# Patient Record
Sex: FEMALE | Race: AMERICAN INDIAN OR ALASKA NATIVE | NOT HISPANIC OR LATINO | Employment: UNEMPLOYED | URBAN - METROPOLITAN AREA
[De-identification: names, ages, dates, MRNs, and addresses within clinical notes are randomized per-mention and may not be internally consistent; named-entity substitution may affect disease eponyms.]

---

## 2017-03-29 ENCOUNTER — OFFICE VISIT (OUTPATIENT)
Dept: MEDICAL GROUP | Facility: CLINIC | Age: 62
End: 2017-03-29
Payer: OTHER GOVERNMENT

## 2017-03-29 VITALS
HEART RATE: 76 BPM | RESPIRATION RATE: 16 BRPM | OXYGEN SATURATION: 95 % | DIASTOLIC BLOOD PRESSURE: 72 MMHG | HEIGHT: 59 IN | SYSTOLIC BLOOD PRESSURE: 128 MMHG | TEMPERATURE: 97.9 F | WEIGHT: 181 LBS | BODY MASS INDEX: 36.49 KG/M2

## 2017-03-29 DIAGNOSIS — M25.512 CHRONIC LEFT SHOULDER PAIN: ICD-10-CM

## 2017-03-29 DIAGNOSIS — E89.0 POSTABLATIVE HYPOTHYROIDISM: ICD-10-CM

## 2017-03-29 DIAGNOSIS — G89.29 CHRONIC LEFT SHOULDER PAIN: ICD-10-CM

## 2017-03-29 DIAGNOSIS — Z85.42 HISTORY OF ENDOMETRIAL CANCER: ICD-10-CM

## 2017-03-29 PROCEDURE — 99214 OFFICE O/P EST MOD 30 MIN: CPT | Performed by: NURSE PRACTITIONER

## 2017-03-29 RX ORDER — NAPROXEN 500 MG/1
500 TABLET ORAL 2 TIMES DAILY WITH MEALS
Qty: 14 TAB | Refills: 0 | Status: SHIPPED | OUTPATIENT
Start: 2017-03-29 | End: 2017-04-05

## 2017-03-29 RX ORDER — CYCLOBENZAPRINE HCL 10 MG
5-10 TABLET ORAL 3 TIMES DAILY PRN
Qty: 30 TAB | Refills: 0 | Status: SHIPPED | OUTPATIENT
Start: 2017-03-29 | End: 2017-12-11

## 2017-03-29 NOTE — MR AVS SNAPSHOT
"        Niya Lamin   3/29/2017 12:00 PM   Office Visit   MRN: [id number]    Department:  [facility]   Dept Phone:  [phone]    Description:  Female : 1955   Provider:  TAYLOR Kemp           Reason for Visit     Fall Fell in November; takes care of  and hasn't had time       Allergies as of 3/29/2017     Allergen Noted Reactions    Byetta 2012   Swelling      You were diagnosed with     Chronic left shoulder pain   [155113]       History of endometrial cancer   [647953]         Vital Signs     Blood Pressure Pulse Temperature Respirations Height Weight    128/72 mmHg 76 36.6 °C (97.9 °F) 16 1.499 m (4' 11.02\") 82.101 kg (181 lb)    Body Mass Index Oxygen Saturation Breastfeeding? Smoking Status          36.54 kg/m2 95% No Former Smoker        Basic Information     Date Of Birth Sex Race Ethnicity Preferred Language    1955 Female White Non- English      Problem List              ICD-10-CM Priority Class Noted - Resolved    Obesity (BMI 35.0-39.9 without comorbidity) (HCC) E66.9 Low  2016 - Present    Postablative hypothyroidism E89.0 Medium  2016 - Present    Osteopenia M85.80 Low  2016 - Present    Chronic joint pain-ankle, knee, back M25.50, G89.29 Low  2016 - Present    Pure hypercholesterolemia E78.00 Medium  2016 - Present    Essential hypertension I10 Medium  2016 - Present    Episode of recurrent major depressive disorder (CMS-HCC) F33.9   2016 - Present    History of endometrial cancer Z85.42   2016 - Present      Health Maintenance        Date Due Completion Dates    IMM DTaP/Tdap/Td Vaccine (1 - Tdap) 9/10/1974 ---    COLONOSCOPY 9/10/2005 ---    MAMMOGRAM 12/10/2016 12/10/2015, 2014, 2012    PAP SMEAR 2019    BONE DENSITY 2020, 2014, 2012            Current Immunizations     Influenza Vaccine Quad Inj (Pf) 2016    SHINGLES VACCINE 2016 "      Below and/or attached are the medications your provider expects you to take. Review all of your home medications and newly ordered medications with your provider and/or pharmacist. Follow medication instructions as directed by your provider and/or pharmacist. Please keep your medication list with you and share with your provider. Update the information when medications are discontinued, doses are changed, or new medications (including over-the-counter products) are added; and carry medication information at all times in the event of emergency situations     Allergies:  BYETTA - Swelling               Medications  Valid as of: March 29, 2017 - 12:50 PM    Generic Name Brand Name Tablet Size Instructions for use    Aspirin-Caffeine   Take  by mouth as needed.        Cyclobenzaprine HCl (Tab) FLEXERIL 10 MG Take 0.5-1 Tabs by mouth 3 times a day as needed for Moderate Pain or Muscle Spasms.        Ezetimibe-Simvastatin (Tab) VYTORIN 10-40 MG Take 1 Tab by mouth every day.        Ibandronate Sodium (Tab) BONIVA 150 MG Take 1 Tab by mouth every 30 days.        Levothyroxine Sodium (Tab) SYNTHROID 150 MCG Take 1 Tab by mouth Every morning on an empty stomach.        Losartan Potassium (Tab) COZAAR 100 MG Take 1 Tab by mouth every day.        Multiple Vitamins-Minerals   Take  by mouth every day.        Naproxen (Tab) NAPROSYN 500 MG Take 1 Tab by mouth 2 times a day, with meals for 7 days.        Sertraline HCl (Tab) ZOLOFT 50 MG Take 1 Tab by mouth every day. Start with half tab for week        Tretinoin Microsphere (Gel) RETIN-A MICRO 0.04 % Apply to affected areas QHS        .                 Medicines prescribed today were sent to:     [facility] - [address] [facility]    [address] [place] [address]    Phone: [phone] Fax: [phone]    Open 24 Hours?: No    [facility] [place] - [address] AT [facility] [address]    [address]  [place] [address]    Phone: [phone] Fax: [phone]    Open 24 Hours?: No      Medication refill instructions:       If your prescription bottle indicates you have medication refills left, it is not necessary to call your provider’s office. Please contact your pharmacy and they will refill your medication.    If your prescription bottle indicates you do not have any refills left, you may request refills at any time through one of the following ways: The online [employer]po[employer] system (except Urgent Care), by calling your provider’s office, or by asking your pharmacy to contact your provider’s office with a refill request. Medication refills are processed only during regular business hours and may not be available until the next business day. Your provider may request additional information or to have a follow-up visit with you prior to refilling your medication.   *Please Note: Medication refills are assigned a new Rx number when refilled electronically. Your pharmacy may indicate that no refills were authorized even though a new prescription for the same medication is available at the pharmacy. Please request the medicine by name with the pharmacy before contacting your provider for a refill.        Your To Do List     Future Labs/Procedures Complete By Expires    CBC WITH DIFFERENTIAL  As directed 3/30/2018    DX-SHOULDER 2+ LEFT  As directed 3/29/2018      Referral     A referral request has been sent to our patient care coordination department. Please allow 3-5 business days for us to process this request and contact you either by phone or mail. If you do not hear from us by the 5th business day, please call us at [phone].           [employer]po[employer] Access Code: Activation code not generated  Current Tempo[employer] Status: Active

## 2017-03-30 NOTE — PROGRESS NOTES
CC: Fall        HPI:     Niya presents today for the followin. Chronic left shoulder pain  Here today stating that in November she had a fall at home in her garage with subsequent left shoulder pain. She states with didn't hurt her too much in the beginning however over the subsequent months is beginning to hurt her more and more.  It has caused her to have decreased range of motion and difficulty sometimes lifting her arm. Other times she can lift it up fine however it's painful to move it back towards her side.    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 This may have caused increased stress in the left arm.    Has been taking aspirin products for pain.    2. History of endometrial cancer  Is required to do an upcoming CBC for Dr. Short. Like the order to our office this does not have multiple blood draws  - CBC WITH DIFFERENTIAL; Future    3. Postablative hypothyroidism  Is currently on her levothyroxine 150 µg. Unfortunately due to communication error she just started this new dose less than a week ago.    Current Outpatient Prescriptions   Medication Sig Dispense Refill   • naproxen (NAPROSYN) 500 MG Tab Take 1 Tab by mouth 2 times a day, with meals for 7 days. 14 Tab 0   • cyclobenzaprine (FLEXERIL) 10 MG Tab Take 0.5-1 Tabs by mouth 3 times a day as needed for Moderate Pain or Muscle Spasms. 30 Tab 0   • levothyroxine (SYNTHROID) 150 MCG Tab Take 1 Tab by mouth Every morning on an empty stomach. 90 Tab 3   • ezetimibe-simvastatin (VYTORIN) 10-40 MG per tablet Take 1 Tab by mouth every day. 90 Tab 3   • ibandronate (BONIVA) 150 MG tablet Take 1 Tab by mouth every 30 days. 4 Tab 3   • losartan (COZAAR) 100 MG Tab Take 1 Tab by mouth every day. 90 Tab 3   • tretinoin microspheres (RETIN-A MICRO) 0.04 % gel Apply to affected areas QHS 45 g 3   • sertraline (ZOLOFT) 50 MG Tab Take 1 Tab by mouth every day. Start with half tab for  "week 90 Tab 3   • Multiple Vitamins-Minerals (WOMENS MULTIVITAMIN PLUS PO) Take  by mouth every day.     • Aspirin-Caffeine (ANALGESIC PO) Take  by mouth as needed.       No current facility-administered medications for this visit.     Social History   Substance Use Topics   • Smoking status: Former Smoker -- 0.10 packs/day for 2 years     Types: Cigarettes   • Smokeless tobacco: Never Used   • Alcohol Use: 0.6 oz/week     1 Shots of liquor per week     I reviewed patients allergies, problem list and medications today in EPIC.    ROS: Any/all pertinent positives listed in the HPI, otherwise all others reviewed are negative today.      /72 mmHg  Pulse 76  Temp(Src) 36.6 °C (97.9 °F)  Resp 16  Ht 1.499 m (4' 11.02\")  Wt 82.101 kg (181 lb)  BMI 36.54 kg/m2  SpO2 95%  Breastfeeding? No    Exam:    Gen: Alert and oriented, No apparent distress. WDWN  Psych: A+Ox3, normal affect and mood  Skin: Warm, dry and intact. Good turgor   No rashes in visible areas.  Eye: Conjunctiva clear, lids normal  ENMT: Lips without lesions, good dentition  Lungs: Clear to auscultation bilaterally, no rales or rhonchi   Unlabored respiratory effort.   No gross deformity erythema or edema of the left shoulder.  Visibly significant decreased range of motion both with lateral, anterior elevation of the arm-reports pain at about 30° off the body. Initially decreased range of motion with internal rotation to touch the back.  Tenderness, mild, on the anterior aspect of the shoulder only  Associated tenderness with discomfort on upper trapezius and around the scapula.    Assessment and Plan.   61 y.o. female with the following issues.    1. Chronic left shoulder pain  Stable. Suspicious for rotator cuff injury. Chronic at this point. Trial of naproxen with Flexeril-caution for sedation with the latter of the 2 medications. X-ray ordered to rule out fracture with fall and she is unsure how she fell.  Discussed if she has improvement " with these medications while physical therapy. She has no improvement will proceed with MRI.  - naproxen (NAPROSYN) 500 MG Tab; Take 1 Tab by mouth 2 times a day, with meals for 7 days.  Dispense: 14 Tab; Refill: 0  - cyclobenzaprine (FLEXERIL) 10 MG Tab; Take 0.5-1 Tabs by mouth 3 times a day as needed for Moderate Pain or Muscle Spasms.  Dispense: 30 Tab; Refill: 0  - DX-SHOULDER 2+ LEFT; Future  - REFERRAL TO PHYSICAL THERAPY Reason for Therapy: Eval/Treat/Report    2. History of endometrial cancer  Stable. CBC ordered per routine following by oncology  - CBC WITH DIFFERENTIAL; Future    3. Postablative hypothyroidism  Stable. Continue medication. Has pending order to complete in 6 weeks. Patient verbalized understanding

## 2017-04-03 ENCOUNTER — APPOINTMENT (OUTPATIENT)
Dept: RADIOLOGY | Facility: IMAGING CENTER | Age: 62
End: 2017-04-03
Attending: NURSE PRACTITIONER
Payer: OTHER GOVERNMENT

## 2017-04-03 ENCOUNTER — NON-PROVIDER VISIT (OUTPATIENT)
Dept: URGENT CARE | Facility: PHYSICIAN GROUP | Age: 62
End: 2017-04-03
Payer: OTHER GOVERNMENT

## 2017-04-03 DIAGNOSIS — M25.512 CHRONIC LEFT SHOULDER PAIN: ICD-10-CM

## 2017-04-03 DIAGNOSIS — G89.29 CHRONIC LEFT SHOULDER PAIN: ICD-10-CM

## 2017-04-03 PROCEDURE — 73030 X-RAY EXAM OF SHOULDER: CPT | Mod: 26,LT | Performed by: PHYSICIAN ASSISTANT

## 2017-12-06 ENCOUNTER — HOSPITAL ENCOUNTER (OUTPATIENT)
Dept: LAB | Facility: MEDICAL CENTER | Age: 62
End: 2017-12-06
Attending: NURSE PRACTITIONER
Payer: OTHER GOVERNMENT

## 2017-12-06 DIAGNOSIS — E89.0 POSTABLATIVE HYPOTHYROIDISM: ICD-10-CM

## 2017-12-06 DIAGNOSIS — Z85.42 HISTORY OF ENDOMETRIAL CANCER: ICD-10-CM

## 2017-12-06 LAB
BASOPHILS # BLD AUTO: 0.6 % (ref 0–1.8)
BASOPHILS # BLD: 0.04 K/UL (ref 0–0.12)
EOSINOPHIL # BLD AUTO: 0 K/UL (ref 0–0.51)
EOSINOPHIL NFR BLD: 0 % (ref 0–6.9)
ERYTHROCYTE [DISTWIDTH] IN BLOOD BY AUTOMATED COUNT: 44.1 FL (ref 35.9–50)
HCT VFR BLD AUTO: 38.8 % (ref 37–47)
HGB BLD-MCNC: 12.8 G/DL (ref 12–16)
IMM GRANULOCYTES # BLD AUTO: 0.03 K/UL (ref 0–0.11)
IMM GRANULOCYTES NFR BLD AUTO: 0.4 % (ref 0–0.9)
LYMPHOCYTES # BLD AUTO: 2.36 K/UL (ref 1–4.8)
LYMPHOCYTES NFR BLD: 32.6 % (ref 22–41)
MCH RBC QN AUTO: 30.9 PG (ref 27–33)
MCHC RBC AUTO-ENTMCNC: 33 G/DL (ref 33.6–35)
MCV RBC AUTO: 93.7 FL (ref 81.4–97.8)
MONOCYTES # BLD AUTO: 0.62 K/UL (ref 0–0.85)
MONOCYTES NFR BLD AUTO: 8.6 % (ref 0–13.4)
NEUTROPHILS # BLD AUTO: 4.2 K/UL (ref 2–7.15)
NEUTROPHILS NFR BLD: 57.8 % (ref 44–72)
NRBC # BLD AUTO: 0 K/UL
NRBC BLD AUTO-RTO: 0 /100 WBC
PLATELET # BLD AUTO: 233 K/UL (ref 164–446)
PMV BLD AUTO: 11.3 FL (ref 9–12.9)
RBC # BLD AUTO: 4.14 M/UL (ref 4.2–5.4)
TSH SERPL DL<=0.005 MIU/L-ACNC: 5.13 UIU/ML (ref 0.3–3.7)
WBC # BLD AUTO: 7.3 K/UL (ref 4.8–10.8)

## 2017-12-06 PROCEDURE — 84443 ASSAY THYROID STIM HORMONE: CPT

## 2017-12-06 PROCEDURE — 85025 COMPLETE CBC W/AUTO DIFF WBC: CPT

## 2017-12-06 PROCEDURE — 36415 COLL VENOUS BLD VENIPUNCTURE: CPT

## 2017-12-11 ENCOUNTER — OFFICE VISIT (OUTPATIENT)
Dept: MEDICAL GROUP | Facility: CLINIC | Age: 62
End: 2017-12-11
Payer: OTHER GOVERNMENT

## 2017-12-11 ENCOUNTER — PATIENT MESSAGE (OUTPATIENT)
Dept: MEDICAL GROUP | Facility: CLINIC | Age: 62
End: 2017-12-11

## 2017-12-11 VITALS
HEIGHT: 59 IN | RESPIRATION RATE: 14 BRPM | HEART RATE: 72 BPM | SYSTOLIC BLOOD PRESSURE: 120 MMHG | TEMPERATURE: 97.7 F | OXYGEN SATURATION: 96 % | BODY MASS INDEX: 35.88 KG/M2 | WEIGHT: 178 LBS | DIASTOLIC BLOOD PRESSURE: 68 MMHG

## 2017-12-11 DIAGNOSIS — Z23 NEED FOR VACCINATION: ICD-10-CM

## 2017-12-11 DIAGNOSIS — E89.0 POSTABLATIVE HYPOTHYROIDISM: ICD-10-CM

## 2017-12-11 DIAGNOSIS — R11.0 NAUSEA: ICD-10-CM

## 2017-12-11 DIAGNOSIS — M85.80 OSTEOPENIA, UNSPECIFIED LOCATION: ICD-10-CM

## 2017-12-11 DIAGNOSIS — Z12.31 ENCOUNTER FOR SCREENING MAMMOGRAM FOR BREAST CANCER: ICD-10-CM

## 2017-12-11 DIAGNOSIS — I10 ESSENTIAL HYPERTENSION: ICD-10-CM

## 2017-12-11 DIAGNOSIS — E78.00 PURE HYPERCHOLESTEROLEMIA: ICD-10-CM

## 2017-12-11 DIAGNOSIS — E66.9 OBESITY (BMI 30-39.9): ICD-10-CM

## 2017-12-11 DIAGNOSIS — F33.9 EPISODE OF RECURRENT MAJOR DEPRESSIVE DISORDER, UNSPECIFIED DEPRESSION EPISODE SEVERITY (HCC): ICD-10-CM

## 2017-12-11 PROCEDURE — 99214 OFFICE O/P EST MOD 30 MIN: CPT | Mod: 25 | Performed by: NURSE PRACTITIONER

## 2017-12-11 PROCEDURE — 90686 IIV4 VACC NO PRSV 0.5 ML IM: CPT | Performed by: NURSE PRACTITIONER

## 2017-12-11 PROCEDURE — 90471 IMMUNIZATION ADMIN: CPT | Performed by: NURSE PRACTITIONER

## 2017-12-11 RX ORDER — IBANDRONATE SODIUM 150 MG/1
150 TABLET, FILM COATED ORAL
Qty: 3 TAB | Refills: 4 | Status: SHIPPED | OUTPATIENT
Start: 2017-12-11 | End: 2018-11-26 | Stop reason: SDUPTHER

## 2017-12-11 RX ORDER — LOSARTAN POTASSIUM 100 MG/1
100 TABLET ORAL DAILY
Qty: 90 TAB | Refills: 3 | Status: SHIPPED | OUTPATIENT
Start: 2017-12-11 | End: 2018-11-26 | Stop reason: SDUPTHER

## 2017-12-11 RX ORDER — LEVOTHYROXINE SODIUM 0.03 MG/1
25 TABLET ORAL
Qty: 90 TAB | Refills: 1 | Status: SHIPPED | OUTPATIENT
Start: 2017-12-11 | End: 2018-11-26

## 2017-12-11 RX ORDER — TRETINOIN 0.4 MG/G
GEL TOPICAL
Qty: 45 G | Refills: 3 | Status: SHIPPED | OUTPATIENT
Start: 2017-12-11 | End: 2019-09-20 | Stop reason: SDUPTHER

## 2017-12-11 RX ORDER — EZETIMIBE AND SIMVASTATIN 10; 40 MG/1; MG/1
1 TABLET ORAL DAILY
Qty: 90 TAB | Refills: 3 | Status: SHIPPED | OUTPATIENT
Start: 2017-12-11 | End: 2018-11-26 | Stop reason: SDUPTHER

## 2017-12-11 ASSESSMENT — PATIENT HEALTH QUESTIONNAIRE - PHQ9: CLINICAL INTERPRETATION OF PHQ2 SCORE: 0

## 2017-12-11 NOTE — PROGRESS NOTES
"CC: Office Visit (Medications refilled; labs results) and GI Problem (A few times while having BM, gets nauseated and once vomited )        HPI:     Niya presents today for the followin. Postablative hypothyroidism  Recent labs show increasing TSH despite compliance with daily med.  She however thinks her medication is 125mcg (no the 150mcg that we changed it to last year).      2. Pure hypercholesterolemia  comlaint with meds, no reports of adverse reactions.  No c/o chest pains    3. Osteopenia, unspecified location  C/o with boniva.  No adverse reactions    4. Essential hypertension  Complaint with losartan-doing well    5. Episode of recurrent major depressive disorder, unspecified depression episode severity (CMS-HCC)  Complaint with zoloft, feels this is hel ing.    6. Nausea  States she has had a handful of occurrences were she will be on the toilet having a BM and will feel nauseous.  On one occasion, she threw up.  Only has occurred with BM, intermittent/rare.  Denies significant constipation of straining.  \"little straining at times\"  No assoc abdominal pain, change in appetite or BMs.    Usually (always) approx 3 BMs/day.     Had neg colonoscopy  in [place].-completely normal.    Current Outpatient Prescriptions   Medication Sig Dispense Refill   • ezetimibe-simvastatin (VYTORIN) 10-40 MG per tablet Take 1 Tab by mouth every day. 90 Tab 3   • ibandronate (BONIVA) 150 MG tablet Take 1 Tab by mouth every 30 days. 3 Tab 4   • losartan (COZAAR) 100 MG Tab Take 1 Tab by mouth every day. 90 Tab 3   • tretinoin microspheres (RETIN-A MICRO) 0.04 % gel Apply to affected areas QHS 45 g 3   • sertraline (ZOLOFT) 50 MG Tab Take 1 Tab by mouth every day. Start with half tab for week 90 Tab 3   • levothyroxine (SYNTHROID) 150 MCG Tab Take 1 Tab by mouth Every morning on an empty stomach. 90 Tab 3   • Multiple Vitamins-Minerals (WOMENS MULTIVITAMIN PLUS PO) Take  by mouth every day.     • Aspirin-Caffeine " "(ANALGESIC PO) Take  by mouth as needed.       No current facility-administered medications for this visit.      Social History   Substance Use Topics   • Smoking status: Former Smoker     Packs/day: 0.10     Years: 2.00     Types: Cigarettes   • Smokeless tobacco: Never Used   • Alcohol use 0.6 oz/week     1 Shots of liquor per week     I reviewed patients allergies, problem list and medications today in EPIC.    ROS: Any/all pertinent positives listed in the HPI, otherwise all others reviewed are negative today.      /68   Pulse 72   Temp 36.5 °C (97.7 °F)   Resp 14   Ht 1.499 m (4' 11\")   Wt 80.7 kg (178 lb)   SpO2 96%   Breastfeeding? No   BMI 35.95 kg/m²     Exam:  Gen: Alert and oriented, No apparent distress. WDWN  Psych: A+Ox3, normal affect and mood  Skin: Warm, dry and intact. Good turgor   No rashes in visible areas.  Eye: Conjunctiva clear, lids normal  ENMT: Lips without lesions, good dentition  Lungs: Clear to auscultation bilaterally, no rales or rhonchi   Unlabored respiratory effort.   CV: Regular rate and rhythm, S1, S2. No murmurs.   No Edema  Abd: Soft non tender, non distended. Normal active bowel sounds.    No Hepatosplenomegaly, No pulsatile masses.   Ext: No clubbing, cyanosis, edema.       Assessment and Plan.   62 y.o. female with the following issues.    1. Postablative hypothyroidism  Stable. We do need increased whatever dose she is currently taking. She will notify me later today if she has 125 or 150 µg at home    2. Pure hypercholesterolemia  Stable. Continue current medications  - ezetimibe-simvastatin (VYTORIN) 10-40 MG per tablet; Take 1 Tab by mouth every day.  Dispense: 90 Tab; Refill: 3    3. Osteopenia, unspecified location  Stable continue current medications  - ibandronate (BONIVA) 150 MG tablet; Take 1 Tab by mouth every 30 days.  Dispense: 3 Tab; Refill: 4    4. Essential hypertension  Stable continue current medications  - losartan (COZAAR) 100 MG Tab; Take 1 " Tab by mouth every day.  Dispense: 90 Tab; Refill: 3    5. Episode of recurrent major depressive disorder, unspecified depression episode severity (CMS-HCC)  Stable continue current medications  - sertraline (ZOLOFT) 50 MG Tab; Take 1 Tab by mouth every day. Start with half tab for week  Dispense: 90 Tab; Refill: 3    6. Encounter for screening mammogram for breast cancer  Ordered  - MA-SCREEN MAMMO W/CAD-BILAT; Future    7. Need for vaccination  I have placed the below orders and discussed them with an approved delegating provider. The MA is performing the below orders under the direction of an office MD.  -Given today.    - INFLUENZA VACCINE QUAD INJ >3Y(PF)    8. Obesity (BMI 30-39.9)  Did discuss increasing fiber  - Patient identified as having weight management issue.  Appropriate orders and counseling given.    9.. Nausea  I do think this is something to do with some mild Valsalva or straining. We'll increase her fiber to notify me she's not getting improvement.

## 2017-12-12 NOTE — TELEPHONE ENCOUNTER
From: Niya Kimble  To: TAYLOR Kemp  Sent: 12/11/2017 2:24 PM PST  Subject: Prescription Question    Just letting you know my Levothyroxine' current dose is 0.15mg so I was wrong thinking it was 125

## 2017-12-30 ENCOUNTER — HOSPITAL ENCOUNTER (OUTPATIENT)
Dept: RADIOLOGY | Facility: MEDICAL CENTER | Age: 62
End: 2017-12-30
Attending: NURSE PRACTITIONER
Payer: OTHER GOVERNMENT

## 2017-12-30 DIAGNOSIS — Z12.31 ENCOUNTER FOR SCREENING MAMMOGRAM FOR BREAST CANCER: ICD-10-CM

## 2017-12-30 PROCEDURE — G0202 SCR MAMMO BI INCL CAD: HCPCS

## 2018-06-28 ENCOUNTER — TELEPHONE (OUTPATIENT)
Dept: MEDICAL GROUP | Facility: CLINIC | Age: 63
End: 2018-06-28

## 2018-06-28 DIAGNOSIS — E89.0 POSTABLATIVE HYPOTHYROIDISM: ICD-10-CM

## 2018-06-29 ENCOUNTER — PATIENT MESSAGE (OUTPATIENT)
Dept: MEDICAL GROUP | Facility: CLINIC | Age: 63
End: 2018-06-29

## 2018-06-29 DIAGNOSIS — E89.0 POSTABLATIVE HYPOTHYROIDISM: ICD-10-CM

## 2018-06-29 RX ORDER — LEVOTHYROXINE SODIUM 175 UG/1
175 TABLET ORAL
Qty: 90 TAB | Refills: 0 | Status: SHIPPED | OUTPATIENT
Start: 2018-06-29 | End: 2018-07-06 | Stop reason: SDUPTHER

## 2018-06-29 NOTE — TELEPHONE ENCOUNTER
1. Caller Name: Niya                                         Call Back Number: [phone] (home)       Patient approves a detailed voicemail message: N\A    Pt asked if she needed a new lab order because she lost the paper and thinks it is now . Also needs refill of higher dose thyroid medication.     Called pt back to let her know lab is not  and she can go to any [facility] lab to have it done or I can mail the order. She will go to the lab near her. Pended medication request.

## 2018-07-06 RX ORDER — LEVOTHYROXINE SODIUM 175 UG/1
175 TABLET ORAL
Qty: 90 TAB | Refills: 0 | Status: SHIPPED | OUTPATIENT
Start: 2018-07-06 | End: 2018-11-26

## 2018-11-19 ENCOUNTER — HOSPITAL ENCOUNTER (OUTPATIENT)
Dept: LAB | Facility: MEDICAL CENTER | Age: 63
End: 2018-11-19
Attending: NURSE PRACTITIONER
Payer: OTHER GOVERNMENT

## 2018-11-19 DIAGNOSIS — E89.0 POSTABLATIVE HYPOTHYROIDISM: ICD-10-CM

## 2018-11-19 LAB — TSH SERPL DL<=0.005 MIU/L-ACNC: 0.15 UIU/ML (ref 0.38–5.33)

## 2018-11-19 PROCEDURE — 84443 ASSAY THYROID STIM HORMONE: CPT

## 2018-11-19 PROCEDURE — 36415 COLL VENOUS BLD VENIPUNCTURE: CPT

## 2018-11-20 ENCOUNTER — TELEPHONE (OUTPATIENT)
Dept: MEDICAL GROUP | Facility: MEDICAL CENTER | Age: 63
End: 2018-11-20

## 2018-11-20 NOTE — TELEPHONE ENCOUNTER
Please call Niya.    Re: thyroid lab I would like to her take only hald a tab of levothyroxine on sundays.  Whole tab other days.  Will recheck her levels in a few months

## 2018-11-21 ENCOUNTER — NON-PROVIDER VISIT (OUTPATIENT)
Dept: MEDICAL GROUP | Facility: MEDICAL CENTER | Age: 63
End: 2018-11-21
Payer: OTHER GOVERNMENT

## 2018-11-21 DIAGNOSIS — Z23 NEED FOR VACCINATION: ICD-10-CM

## 2018-11-21 PROCEDURE — 90715 TDAP VACCINE 7 YRS/> IM: CPT | Performed by: NURSE PRACTITIONER

## 2018-11-21 PROCEDURE — 90472 IMMUNIZATION ADMIN EACH ADD: CPT | Performed by: NURSE PRACTITIONER

## 2018-11-21 PROCEDURE — 90686 IIV4 VACC NO PRSV 0.5 ML IM: CPT | Performed by: NURSE PRACTITIONER

## 2018-11-21 PROCEDURE — 90471 IMMUNIZATION ADMIN: CPT | Performed by: NURSE PRACTITIONER

## 2018-11-26 ENCOUNTER — OFFICE VISIT (OUTPATIENT)
Dept: MEDICAL GROUP | Facility: MEDICAL CENTER | Age: 63
End: 2018-11-26
Payer: OTHER GOVERNMENT

## 2018-11-26 VITALS
HEART RATE: 78 BPM | BODY MASS INDEX: 34.88 KG/M2 | OXYGEN SATURATION: 96 % | TEMPERATURE: 98.4 F | HEIGHT: 59 IN | DIASTOLIC BLOOD PRESSURE: 64 MMHG | SYSTOLIC BLOOD PRESSURE: 124 MMHG | WEIGHT: 173 LBS | RESPIRATION RATE: 14 BRPM

## 2018-11-26 DIAGNOSIS — N39.41 URGE INCONTINENCE: ICD-10-CM

## 2018-11-26 DIAGNOSIS — I10 ESSENTIAL HYPERTENSION: ICD-10-CM

## 2018-11-26 DIAGNOSIS — Z78.0 POSTMENOPAUSAL: ICD-10-CM

## 2018-11-26 DIAGNOSIS — R06.83 SNORING: ICD-10-CM

## 2018-11-26 DIAGNOSIS — E66.9 OBESITY (BMI 30-39.9): ICD-10-CM

## 2018-11-26 DIAGNOSIS — E89.0 POSTABLATIVE HYPOTHYROIDISM: ICD-10-CM

## 2018-11-26 DIAGNOSIS — E78.00 PURE HYPERCHOLESTEROLEMIA: ICD-10-CM

## 2018-11-26 DIAGNOSIS — Z12.11 COLON CANCER SCREENING: ICD-10-CM

## 2018-11-26 DIAGNOSIS — F33.9 EPISODE OF RECURRENT MAJOR DEPRESSIVE DISORDER, UNSPECIFIED DEPRESSION EPISODE SEVERITY (HCC): ICD-10-CM

## 2018-11-26 DIAGNOSIS — R79.89 LOW TSH LEVEL: ICD-10-CM

## 2018-11-26 PROCEDURE — 99214 OFFICE O/P EST MOD 30 MIN: CPT | Performed by: NURSE PRACTITIONER

## 2018-11-26 RX ORDER — LOSARTAN POTASSIUM 100 MG/1
100 TABLET ORAL DAILY
Qty: 90 TAB | Refills: 3 | Status: SHIPPED | OUTPATIENT
Start: 2018-11-26 | End: 2019-09-20 | Stop reason: SDUPTHER

## 2018-11-26 RX ORDER — LEVOTHYROXINE SODIUM 0.15 MG/1
150 TABLET ORAL
Qty: 96 TAB | Refills: 0 | Status: SHIPPED | OUTPATIENT
Start: 2018-11-26 | End: 2019-06-09 | Stop reason: SDUPTHER

## 2018-11-26 RX ORDER — SERTRALINE HYDROCHLORIDE 100 MG/1
100 TABLET, FILM COATED ORAL DAILY
Qty: 90 TAB | Refills: 3 | Status: SHIPPED | OUTPATIENT
Start: 2018-11-26 | End: 2019-09-20 | Stop reason: SDUPTHER

## 2018-11-26 RX ORDER — IBANDRONATE SODIUM 150 MG/1
150 TABLET, FILM COATED ORAL
Qty: 3 TAB | Refills: 4 | Status: SHIPPED | OUTPATIENT
Start: 2018-11-26 | End: 2019-09-20

## 2018-11-26 RX ORDER — EZETIMIBE AND SIMVASTATIN 10; 40 MG/1; MG/1
1 TABLET ORAL DAILY
Qty: 90 TAB | Refills: 3 | Status: SHIPPED | OUTPATIENT
Start: 2018-11-26 | End: 2019-09-20

## 2018-11-26 ASSESSMENT — PATIENT HEALTH QUESTIONNAIRE - PHQ9
7. TROUBLE CONCENTRATING ON THINGS, SUCH AS READING THE NEWSPAPER OR WATCHING TELEVISION: NEARLY EVERY DAY
SUM OF ALL RESPONSES TO PHQ9 QUESTIONS 1 AND 2: 0
2. FEELING DOWN, DEPRESSED, IRRITABLE, OR HOPELESS: NOT AT ALL
3. TROUBLE FALLING OR STAYING ASLEEP OR SLEEPING TOO MUCH: NEARLY EVERY DAY
8. MOVING OR SPEAKING SO SLOWLY THAT OTHER PEOPLE COULD HAVE NOTICED. OR THE OPPOSITE, BEING SO FIGETY OR RESTLESS THAT YOU HAVE BEEN MOVING AROUND A LOT MORE THAN USUAL: NOT AT ALL
4. FEELING TIRED OR HAVING LITTLE ENERGY: NEARLY EVERY DAY
5. POOR APPETITE OR OVEREATING: NEARLY EVERY DAY
6. FEELING BAD ABOUT YOURSELF - OR THAT YOU ARE A FAILURE OR HAVE LET YOURSELF OR YOUR FAMILY DOWN: NOT AL ALL
9. THOUGHTS THAT YOU WOULD BE BETTER OFF DEAD, OR OF HURTING YOURSELF: NOT AT ALL

## 2018-11-26 NOTE — PROGRESS NOTES
CC: Medication Refill (medication refills;)        HPI:     Niya presents today for the followin. Low TSH level/Postablative hypothyroidism  Current dose of 175 mcg of levothyroxine daily.  Current thyroid level shows a low TSH.  Previously was high however she was on 150 at that time.    3. Essential hypertension  Compliant with losartan, no reports of adverse effect at home    4. Pure hypercholesterolemia  Complain on Vytorin, no reports of adverse effect at home, due for lipid panel    5. Snoring  Reports chronic fatigue and does snore    6. Urge incontinence  States she does have issues with urge incontinence.  She denies frequency.  She states that when she does get the sensation that she has to urinate she has to go right then or she has a potential for leakage.  Wears pads.  Does also leak if she sneezes or coughs    7. Episode of recurrent major depressive disorder, unspecified depression episode severity (HCC)  Doing well on the 50 mg of sertraline.  She does have a lot of stress taking care of her ill .  She feels she possibly could benefit more from taking 100 mg    8. Obesity (BMI 30-39.9)  Weight is down        Current Outpatient Prescriptions   Medication Sig Dispense Refill   • levothyroxine (SYNTHROID) 150 MCG Tab Take 1 Tab by mouth Every morning on an empty stomach. 1.5 tabs on  only 96 Tab 0   • ezetimibe-simvastatin (VYTORIN) 10-40 MG per tablet Take 1 Tab by mouth every day. 90 Tab 3   • ibandronate (BONIVA) 150 MG tablet Take 1 Tab by mouth every 30 days. 3 Tab 4   • losartan (COZAAR) 100 MG Tab Take 1 Tab by mouth every day. 90 Tab 3   • sertraline (ZOLOFT) 100 MG Tab Take 1 Tab by mouth every day. 90 Tab 3   • tretinoin microspheres (RETIN-A MICRO) 0.04 % gel Apply to affected areas QHS 45 g 3   • Multiple Vitamins-Minerals (WOMENS MULTIVITAMIN PLUS PO) Take  by mouth every day.     • Aspirin-Caffeine (ANALGESIC PO) Take  by mouth as needed.       No current  "facility-administered medications for this visit.      Social History   Substance Use Topics   • Smoking status: Former Smoker     Packs/day: 0.10     Years: 2.00     Types: Cigarettes   • Smokeless tobacco: Never Used   • Alcohol use 0.6 oz/week     1 Shots of liquor per week     I reviewed patients allergies, problem list and medications today in EPIC.    ROS: Any/all pertinent positives listed in the HPI, otherwise all others reviewed are negative today.      /64 (BP Location: Left arm, Patient Position: Sitting, BP Cuff Size: Adult)   Pulse 78   Temp 36.9 °C (98.4 °F) (Temporal)   Resp 14   Ht 1.499 m (4' 11\")   Wt 78.5 kg (173 lb)   SpO2 96%   BMI 34.94 kg/m²     Exam:   Gen: Alert and oriented, No apparent distress. WDWN  Psych: A+Ox3, normal affect and mood  Skin: Warm, dry and intact. Good turgor   No rashes in visible areas.  Eye: Conjunctiva clear, lids normal  ENMT: Lips without lesions, good dentition  Neck: No Lymphadenopathy, Thyromegaly, Bruits.   Trachea midline, no masses  Lungs: Clear to auscultation bilaterally, no rales or rhonchi   Unlabored respiratory effort.   CV: Regular rate and rhythm, S1, S2. No murmurs.   No Edema        Assessment and Plan.   63 y.o. female with the following issues.    1. Low TSH level/ Postablative hypothyroidism  STABLE, we will decrease back to 150 mcg and we discussed for her to take 1.5 tabs on Sunday.  1 tab on all the other days.  Recheck labs in around 2 months  - TSH; Future  - levothyroxine (SYNTHROID) 150 MCG Tab; Take 1 Tab by mouth Every morning on an empty stomach. 1.5 tabs on Sunday only  Dispense: 96 Tab; Refill: 0    3. Essential hypertension  Normotensive, continue medication, lab ordered  - COMP METABOLIC PANEL; Future  - losartan (COZAAR) 100 MG Tab; Take 1 Tab by mouth every day.  Dispense: 90 Tab; Refill: 3    4. Pure hypercholesterolemia  Stable, continue medication, lab ordered  - Lipid Profile; Future  - ezetimibe-simvastatin " (VYTORIN) 10-40 MG per tablet; Take 1 Tab by mouth every day.  Dispense: 90 Tab; Refill: 3    5. Snoring  OPO on RA    6. Urge incontinence  Patient was advised to do Keagle's and will follow up with urinalysis in the lab.  If no improvement we may consider referral to urology  - URINALYSIS,CULTURE IF INDICATED; Future    7. Episode of recurrent major depressive disorder, unspecified depression episode severity (HCC)  Stable.  Increase to 100 mcg  - sertraline (ZOLOFT) 100 MG Tab; Take 1 Tab by mouth every day.  Dispense: 90 Tab; Refill: 3    8. Obesity (BMI 30-39.9)  Weight is currently slightly down    9. Postmenopausal  Ordered  - DS-BONE DENSITY STUDY (DEXA); Future    10. Colon cancer screening  Ordered, declines colonoscopy, low risk  - COLOGUARD (FIT DNA)

## 2018-12-03 NOTE — PROGRESS NOTES
"Niya Kimble is a 63 y.o. female here for a non-provider visit for:   FLU    Reason for immunization: Annual Flu Vaccine  Immunization records indicate need for vaccine: Yes, confirmed with Epic  Minimum interval has been met for this vaccine: Yes  ABN completed: Yes    Order and dose verified by:   VIS Dated  6/18 was given to patient: Yes  All IAC Questionnaire questions were answered \"No.\"    Patient tolerated injection and no adverse effects were observed or reported: Yes    Pt scheduled for next dose in series: Not Indicated  "

## 2018-12-12 ENCOUNTER — HOSPITAL ENCOUNTER (OUTPATIENT)
Dept: RADIOLOGY | Facility: MEDICAL CENTER | Age: 63
End: 2018-12-12
Attending: NURSE PRACTITIONER
Payer: OTHER GOVERNMENT

## 2018-12-12 DIAGNOSIS — Z78.0 POSTMENOPAUSAL: ICD-10-CM

## 2018-12-12 PROCEDURE — 77080 DXA BONE DENSITY AXIAL: CPT

## 2019-02-04 ENCOUNTER — OFFICE VISIT (OUTPATIENT)
Dept: URGENT CARE | Facility: PHYSICIAN GROUP | Age: 64
End: 2019-02-04
Payer: OTHER GOVERNMENT

## 2019-02-04 VITALS
HEIGHT: 59 IN | OXYGEN SATURATION: 93 % | SYSTOLIC BLOOD PRESSURE: 138 MMHG | RESPIRATION RATE: 18 BRPM | BODY MASS INDEX: 34.27 KG/M2 | DIASTOLIC BLOOD PRESSURE: 76 MMHG | TEMPERATURE: 97.9 F | WEIGHT: 170 LBS | HEART RATE: 74 BPM

## 2019-02-04 DIAGNOSIS — M79.10 MYALGIA: ICD-10-CM

## 2019-02-04 DIAGNOSIS — J06.9 VIRAL URI WITH COUGH: ICD-10-CM

## 2019-02-04 PROCEDURE — 99214 OFFICE O/P EST MOD 30 MIN: CPT | Performed by: FAMILY MEDICINE

## 2019-02-04 RX ORDER — DICLOFENAC SODIUM 75 MG/1
75 TABLET, DELAYED RELEASE ORAL 2 TIMES DAILY
Qty: 60 TAB | Refills: 0 | Status: SHIPPED | OUTPATIENT
Start: 2019-02-04 | End: 2019-09-20

## 2019-02-04 ASSESSMENT — ENCOUNTER SYMPTOMS
NAUSEA: 0
VOMITING: 0
HEADACHES: 1
MYALGIAS: 1
SORE THROAT: 1
DIARRHEA: 0
ABDOMINAL PAIN: 0
COUGH: 1
SHORTNESS OF BREATH: 0

## 2019-02-05 NOTE — PROGRESS NOTES
Subjective:     Niya Kimble is a 63 y.o. female who presents for Pharyngitis (x 3 days ) and Generalized Body Aches    HPI  Pt presents for evaluation of a new problem   Pt with sore throat and myalgias for the past 3 days   Throat is constantly painful and worse with swallowing   Coughing a moderate amount   Cough is improving a little today  Myalgias are worst symptom   Feeling achy all over and worse in the back and joints   Myalgias are constant and nothing makes them better   Myalgias are overall stable and not worsening  No sick contacts with similar symptoms  Did get flu shot this year    Review of Systems   HENT: Positive for congestion and sore throat.    Respiratory: Positive for cough. Negative for shortness of breath.    Cardiovascular: Negative for chest pain.   Gastrointestinal: Negative for abdominal pain, diarrhea, nausea and vomiting.   Musculoskeletal: Positive for myalgias.   Skin: Negative for rash.   Neurological: Positive for headaches.     PMH:  has a past medical history of Arthritis; Cancer (HCC); Other specified symptom associated with female genital organs; Pain; Snoring; and Unspecified disorder of thyroid.  MEDS:   Current Outpatient Prescriptions:   •  levothyroxine (SYNTHROID) 150 MCG Tab, Take 1 Tab by mouth Every morning on an empty stomach. 1.5 tabs on Sunday only, Disp: 96 Tab, Rfl: 0  •  ezetimibe-simvastatin (VYTORIN) 10-40 MG per tablet, Take 1 Tab by mouth every day., Disp: 90 Tab, Rfl: 3  •  ibandronate (BONIVA) 150 MG tablet, Take 1 Tab by mouth every 30 days., Disp: 3 Tab, Rfl: 4  •  losartan (COZAAR) 100 MG Tab, Take 1 Tab by mouth every day., Disp: 90 Tab, Rfl: 3  •  sertraline (ZOLOFT) 100 MG Tab, Take 1 Tab by mouth every day., Disp: 90 Tab, Rfl: 3  •  tretinoin microspheres (RETIN-A MICRO) 0.04 % gel, Apply to affected areas QHS, Disp: 45 g, Rfl: 3  •  Multiple Vitamins-Minerals (WOMENS MULTIVITAMIN PLUS PO), Take  by mouth every day., Disp: , Rfl:   •   "Aspirin-Caffeine (ANALGESIC PO), Take  by mouth as needed., Disp: , Rfl:   ALLERGIES:   Allergies   Allergen Reactions   • Byetta Swelling     SURGHX:   Past Surgical History:   Procedure Laterality Date   • HYSTERECTOMY ROBOTIC  6/12/2012    Performed by JOSIE MARIA at SURGERY [facility]   • LYMPH NODE DISSECTION ROBOTIC  6/12/2012    Performed by JOSIE MARIA at SURGERY [facility]   • ABDOMINAL HYSTERECTOMY TOTAL  6/2012    endometrial cancer; MARIA    • ANKLE LIGAMENT RECONSTRUCTION Right     multiple ankle surgery since childhood-see scanned media report   • CHOLECYSTECTOMY     • LUMBAR LAMINECTOMY DISKECTOMY      Rammy, L5=S1, L4-L5   • OPEN REDUCTION     • OTHER ORTHOPEDIC SURGERY      ankle surgery, multiple   • PRIMARY C SECTION      c/s 1     SOCHX:  reports that she has quit smoking. Her smoking use included Cigarettes. She has a 0.20 pack-year smoking history. She has never used smokeless tobacco. She reports that she drinks about 0.6 oz of alcohol per week . She reports that she uses drugs, including Marijuana.  FH: Family history was reviewed, not contributing to acute illness     Objective:   /76   Pulse 74   Temp 36.6 °C (97.9 °F) (Temporal)   Resp 18   Ht 1.499 m (4' 11\")   Wt 77.1 kg (170 lb)   SpO2 93%   BMI 34.34 kg/m²     Physical Exam   Constitutional: She appears well-developed and well-nourished. No distress.   HENT:   Head: Normocephalic and atraumatic.   Right Ear: Tympanic membrane, external ear and ear canal normal.   Left Ear: Tympanic membrane, external ear and ear canal normal.   Nose: Mucosal edema and rhinorrhea present.   Mouth/Throat: Uvula is midline, oropharynx is clear and moist and mucous membranes are normal.   Eyes: Conjunctivae and EOM are normal. Right eye exhibits no discharge. Left eye exhibits no discharge. No scleral icterus.   Neck: Normal range of motion. No tracheal deviation present.   Cardiovascular: Normal rate and regular rhythm.  "   Pulmonary/Chest: Effort normal and breath sounds normal. No respiratory distress. She has no wheezes. She has no rales.   Neurological: She is alert. Coordination normal.   Skin: Skin is warm and dry. No rash noted. She is not diaphoretic.   Psychiatric: She has a normal mood and affect. Her behavior is normal. Judgment and thought content normal.       Assessment/Plan:   Assessment    1. Viral URI with cough  2. Myalgia  Patient is a 63-year-old female with history and exam consistent with viral URI.  Could have influenza, however is outside the treatment window and opted not to be tested.  Her myalgias are the worst symptom.  Will treat with anti-inflammatories.  Her cough is tolerable and already improving a little bit.  Advised that illness should be self-limited and reviewed other supportive care measures.  Patient will follow-up on an as-needed basis.  - diclofenac EC (VOLTAREN) 75 MG Tablet Delayed Response; Take 1 Tab by mouth 2 times a day.  Dispense: 60 Tab; Refill: 0

## 2019-05-29 ENCOUNTER — TELEPHONE (OUTPATIENT)
Dept: MEDICAL GROUP | Facility: MEDICAL CENTER | Age: 64
End: 2019-05-29

## 2019-05-29 DIAGNOSIS — R19.5 POSITIVE COLORECTAL CANCER SCREENING USING COLOGUARD TEST: ICD-10-CM

## 2019-06-03 ENCOUNTER — TELEPHONE (OUTPATIENT)
Dept: MEDICAL GROUP | Facility: MEDICAL CENTER | Age: 64
End: 2019-06-03

## 2019-06-03 DIAGNOSIS — G47.34 NOCTURNAL HYPOXIA: ICD-10-CM

## 2019-06-03 NOTE — TELEPHONE ENCOUNTER
pls call Niya-    Home test is + for low O2 when sleeping.  Referral to sleep clinic placed.    If she would like home O2 to use at night in the interim, I will place order

## 2019-06-06 ENCOUNTER — HOSPITAL ENCOUNTER (OUTPATIENT)
Dept: LAB | Facility: MEDICAL CENTER | Age: 64
End: 2019-06-06
Attending: NURSE PRACTITIONER
Payer: OTHER GOVERNMENT

## 2019-06-06 DIAGNOSIS — E89.0 POSTABLATIVE HYPOTHYROIDISM: ICD-10-CM

## 2019-06-06 DIAGNOSIS — I10 ESSENTIAL HYPERTENSION: ICD-10-CM

## 2019-06-06 DIAGNOSIS — E78.00 PURE HYPERCHOLESTEROLEMIA: ICD-10-CM

## 2019-06-06 DIAGNOSIS — N39.41 URGE INCONTINENCE: ICD-10-CM

## 2019-06-06 DIAGNOSIS — R79.89 LOW TSH LEVEL: ICD-10-CM

## 2019-06-06 LAB
ALBUMIN SERPL BCP-MCNC: 4.4 G/DL (ref 3.2–4.9)
ALBUMIN/GLOB SERPL: 1.8 G/DL
ALP SERPL-CCNC: 63 U/L (ref 30–99)
ALT SERPL-CCNC: 12 U/L (ref 2–50)
AMORPH CRY #/AREA URNS HPF: PRESENT /HPF
ANION GAP SERPL CALC-SCNC: 9 MMOL/L (ref 0–11.9)
APPEARANCE UR: ABNORMAL
AST SERPL-CCNC: 18 U/L (ref 12–45)
BACTERIA #/AREA URNS HPF: ABNORMAL /HPF
BILIRUB SERPL-MCNC: 0.4 MG/DL (ref 0.1–1.5)
BILIRUB UR QL STRIP.AUTO: NEGATIVE
BUN SERPL-MCNC: 15 MG/DL (ref 8–22)
CALCIUM SERPL-MCNC: 8.9 MG/DL (ref 8.5–10.5)
CAOX CRY #/AREA URNS HPF: ABNORMAL /HPF
CHLORIDE SERPL-SCNC: 110 MMOL/L (ref 96–112)
CHOLEST SERPL-MCNC: 170 MG/DL (ref 100–199)
CO2 SERPL-SCNC: 24 MMOL/L (ref 20–33)
COLOR UR: YELLOW
CREAT SERPL-MCNC: 0.79 MG/DL (ref 0.5–1.4)
EPI CELLS #/AREA URNS HPF: NEGATIVE /HPF
FASTING STATUS PATIENT QL REPORTED: NORMAL
GLOBULIN SER CALC-MCNC: 2.5 G/DL (ref 1.9–3.5)
GLUCOSE SERPL-MCNC: 96 MG/DL (ref 65–99)
GLUCOSE UR STRIP.AUTO-MCNC: NEGATIVE MG/DL
HDLC SERPL-MCNC: 61 MG/DL
HYALINE CASTS #/AREA URNS LPF: ABNORMAL /LPF
KETONES UR STRIP.AUTO-MCNC: NEGATIVE MG/DL
LDLC SERPL CALC-MCNC: 84 MG/DL
LEUKOCYTE ESTERASE UR QL STRIP.AUTO: NEGATIVE
MICRO URNS: ABNORMAL
NITRITE UR QL STRIP.AUTO: NEGATIVE
PH UR STRIP.AUTO: 5.5 [PH]
POTASSIUM SERPL-SCNC: 3.8 MMOL/L (ref 3.6–5.5)
PROT SERPL-MCNC: 6.9 G/DL (ref 6–8.2)
PROT UR QL STRIP: NEGATIVE MG/DL
RBC # URNS HPF: ABNORMAL /HPF
RBC UR QL AUTO: NEGATIVE
SODIUM SERPL-SCNC: 143 MMOL/L (ref 135–145)
SP GR UR STRIP.AUTO: 1.03
TRIGL SERPL-MCNC: 126 MG/DL (ref 0–149)
TSH SERPL DL<=0.005 MIU/L-ACNC: 10.85 UIU/ML (ref 0.38–5.33)
UROBILINOGEN UR STRIP.AUTO-MCNC: 0.2 MG/DL
WBC #/AREA URNS HPF: ABNORMAL /HPF

## 2019-06-06 PROCEDURE — 80053 COMPREHEN METABOLIC PANEL: CPT

## 2019-06-06 PROCEDURE — 81001 URINALYSIS AUTO W/SCOPE: CPT

## 2019-06-06 PROCEDURE — 36415 COLL VENOUS BLD VENIPUNCTURE: CPT

## 2019-06-06 PROCEDURE — 84443 ASSAY THYROID STIM HORMONE: CPT

## 2019-06-06 PROCEDURE — 80061 LIPID PANEL: CPT

## 2019-06-06 NOTE — TELEPHONE ENCOUNTER
Sandra, patient called and said that she has been having a hard time with sleeping and requested to try the oxygen at night. Therefore I faxed a standard order to [facility] for nocturnal O2 on 2 LPM unless you want different orders.

## 2019-06-07 ENCOUNTER — TELEPHONE (OUTPATIENT)
Dept: MEDICAL GROUP | Facility: MEDICAL CENTER | Age: 64
End: 2019-06-07

## 2019-06-07 NOTE — TELEPHONE ENCOUNTER
Please call Niya re: her recent labs and how she is currently taking her thyroid medication.      (maya msg also sent)      -Thyroid is OUT of normal range-now showing that you need a bit more medication.  I know we discussed taking the levothyroxine 150mcg daily with an extra 1/2 tab on Sundays.    Is that how she is taking this medication?    Please let me know so I can adjust  medications further.

## 2019-06-09 DIAGNOSIS — E89.0 POSTABLATIVE HYPOTHYROIDISM: ICD-10-CM

## 2019-06-10 RX ORDER — LEVOTHYROXINE SODIUM 0.15 MG/1
150 TABLET ORAL
Qty: 96 TAB | Refills: 0 | Status: SHIPPED | OUTPATIENT
Start: 2019-06-10 | End: 2019-09-20 | Stop reason: SDUPTHER

## 2019-06-10 NOTE — TELEPHONE ENCOUNTER
From: Niya Kimble  Sent: 6/9/2019 9:22 PM PDT  Subject: Medication Renewal Request    Niya Kimble would like a refill of the following medications:     levothyroxine (SYNTHROID) 150 MCG Tab [TAYLOR Kepm]   Patient Comment: To be honest, my Levo 150 has been out and I knew I had to get my blood test done before I could get a refill. I was trying to break up what I had left of the 175's. Sorry I messed that one up. Should we just go back to the 150 mg?    Preferred pharmacy: [facility] - [id number]

## 2019-06-11 ENCOUNTER — PATIENT MESSAGE (OUTPATIENT)
Dept: MEDICAL GROUP | Facility: MEDICAL CENTER | Age: 64
End: 2019-06-11

## 2019-09-11 ENCOUNTER — SLEEP CENTER VISIT (OUTPATIENT)
Dept: SLEEP MEDICINE | Facility: MEDICAL CENTER | Age: 64
End: 2019-09-11
Payer: OTHER GOVERNMENT

## 2019-09-11 VITALS
DIASTOLIC BLOOD PRESSURE: 70 MMHG | BODY MASS INDEX: 34.27 KG/M2 | SYSTOLIC BLOOD PRESSURE: 110 MMHG | RESPIRATION RATE: 15 BRPM | HEART RATE: 62 BPM | OXYGEN SATURATION: 93 % | HEIGHT: 59 IN | WEIGHT: 170 LBS

## 2019-09-11 DIAGNOSIS — G47.34 NOCTURNAL HYPOXIA: ICD-10-CM

## 2019-09-11 DIAGNOSIS — F51.04 CHRONIC INSOMNIA: ICD-10-CM

## 2019-09-11 DIAGNOSIS — G47.30 SLEEP DISORDER BREATHING: ICD-10-CM

## 2019-09-11 DIAGNOSIS — E66.9 OBESITY (BMI 30-39.9): ICD-10-CM

## 2019-09-11 PROCEDURE — 99204 OFFICE O/P NEW MOD 45 MIN: CPT | Performed by: FAMILY MEDICINE

## 2019-09-11 RX ORDER — SIMVASTATIN 40 MG
40 TABLET ORAL NIGHTLY
COMMUNITY
End: 2019-09-20 | Stop reason: SDUPTHER

## 2019-09-11 RX ORDER — ZOLPIDEM TARTRATE 5 MG/1
5 TABLET ORAL NIGHTLY PRN
Qty: 2 TAB | Refills: 0 | Status: SHIPPED | OUTPATIENT
Start: 2019-09-11 | End: 2019-09-12

## 2019-09-11 NOTE — PATIENT INSTRUCTIONS
Zolpidem tablets  What is this medicine?  ZOLPIDEM (zole PI dem) is used to treat insomnia. This medicine helps you to fall asleep and sleep through the night.  This medicine may be used for other purposes; ask your health care provider or pharmacist if you have questions.  COMMON BRAND NAME(S): Rohan  What should I tell my health care provider before I take this medicine?  They need to know if you have any of these conditions:  -depression  -history of drug abuse or addiction  -if you often drink alcohol  -liver disease  -lung or breathing disease  -myasthenia gravis  -sleep apnea  -suicidal thoughts, plans, or attempt; a previous suicide attempt by you or a family member  -an unusual or allergic reaction to zolpidem, other medicines, foods, dyes, or preservatives  -pregnant or trying to get pregnant  -breast-feeding  How should I use this medicine?  Take this medicine by mouth with a glass of water. Follow the directions on the prescription label. It is better to take this medicine on an empty stomach and only when you are ready for bed. Do not take your medicine more often than directed. If you have been taking this medicine for several weeks and suddenly stop taking it, you may get unpleasant withdrawal symptoms. Your doctor or health care professional may want to gradually reduce the dose. Do not stop taking this medicine on your own. Always follow your doctor or health care professional's advice.  A special MedGuide will be given to you by the pharmacist with each prescription and refill. Be sure to read this information carefully each time.  Talk to your pediatrician regarding the use of this medicine in children. Special care may be needed.  Overdosage: If you think you have taken too much of this medicine contact a poison control center or emergency room at once.  NOTE: This medicine is only for you. Do not share this medicine with others.  What if I miss a dose?  This does not apply. This medicine should  "only be taken immediately before going to sleep. Do not take double or extra doses.  What may interact with this medicine?  -alcohol  -antihistamines for allergy, cough and cold  -certain medicines for anxiety or sleep  -certain medicines for depression, like amitriptyline, fluoxetine, sertraline  -certain medicines for fungal infections like ketoconazole and itraconazole  -certain medicines for seizures like phenobarbital, primidone  -ciprofloxacin  -dietary supplements for sleep, like valerian or kava kava  -general anesthetics like halothane, isoflurane, methoxyflurane, propofol  -local anesthetics like lidocaine, pramoxine, tetracaine  -medicines that relax muscles for surgery  -narcotic medicines for pain  -phenothiazines like chlorpromazine, mesoridazine, prochlorperazine, thioridazine  -rifampin  This list may not describe all possible interactions. Give your health care provider a list of all the medicines, herbs, non-prescription drugs, or dietary supplements you use. Also tell them if you smoke, drink alcohol, or use illegal drugs. Some items may interact with your medicine.  What should I watch for while using this medicine?  Visit your doctor or health care professional for regular checks on your progress. Keep a regular sleep schedule by going to bed at about the same time each night. Avoid caffeine-containing drinks in the evening hours. When sleep medicines are used every night for more than a few weeks, they may stop working. Talk to your doctor if you still have trouble sleeping.  After taking this medicine for sleep, you may get up out of bed while not being fully awake and do an activity that you do not know you are doing. The next morning, you may have no memory of the event. Activities such as driving a car (\"sleep-driving\"), making and eating food, talking on the phone, sexual activity, and sleep-walking have been reported. Call your doctor right away if you find out you have done any of these " activities. Do not take this medicine if you have used alcohol that evening or before bed or taken another medicine for sleep since your risk of doing these sleep-related activities will be increased.  Wait for at least 8 hours after you take a dose before driving or doing other activities that require full mental alertness. Do not take this medicine unless you are able to stay in bed for a full night (7 to 8 hours) before you must be active again. You may have a decrease in mental alertness the day after use, even if you feel that you are fully awake. Tell your doctor if you will need to perform activities requiring full alertness, such as driving, the next day. Do not stand or sit up quickly after taking this medicine, especially if you are an older patient. This reduces the risk of dizzy or fainting spells.  If you or your family notice any changes in your behavior, such as new or worsening depression, thoughts of harming yourself, anxiety, other unusual or disturbing thoughts, or memory loss, call your doctor right away.  After you stop taking this medicine, you may have trouble falling asleep. This is called rebound insomnia. This problem usually goes away on its own after 1 or 2 nights.  What side effects may I notice from receiving this medicine?  Side effects that you should report to your doctor or health care professional as soon as possible:  -allergic reactions like skin rash, itching or hives, swelling of the face, lips, or tongue  -breathing problems  -changes in vision  -confusion  -depressed mood or other changes in moods or emotions  -feeling faint or lightheaded, falls  -hallucinations  -loss of balance or coordination  -loss of memory  -numbness or tingling of the tongue  -restlessness, excitability, or feelings of anxiety or agitation  -signs and symptoms of liver injury like dark yellow or brown urine; general ill feeling or flu-like symptoms; light-colored stools; loss of appetite; nausea;  right upper belly pain; unusually weak or tired; yellowing of the eyes or skin  -suicidal thoughts  -unusual activities while asleep like driving, eating, making phone calls, or sexual activity  Side effects that usually do not require medical attention (report to your doctor or health care professional if they continue or are bothersome):  -dizziness  -drowsiness the day after you take this medicine  -headache  This list may not describe all possible side effects. Call your doctor for medical advice about side effects. You may report side effects to FDA at 7-234-FDA-0309.  Where should I keep my medicine?  Keep out of the reach of children. This medicine can be abused. Keep your medicine in a safe place to protect it from theft. Do not share this medicine with anyone. Selling or giving away this medicine is dangerous and against the law.  This medicine may cause accidental overdose and death if taken by other adults, children, or pets. Mix any unused medicine with a substance like cat litter or coffee grounds. Then throw the medicine away in a sealed container like a sealed bag or a coffee can with a lid. Do not use the medicine after the expiration date.  Store at room temperature between 20 and 25 degrees C (68 and 77 degrees F).  NOTE: This sheet is a summary. It may not cover all possible information. If you have questions about this medicine, talk to your doctor, pharmacist, or health care provider.  © 2018 Elsevier/Gold Standard (2017-03-22 14:38:20)  Sleep Apnea  Sleep apnea is a condition in which breathing pauses or becomes shallow during sleep. Episodes of sleep apnea usually last 10 seconds or longer, and they may occur as many as 20 times an hour. Sleep apnea disrupts your sleep and keeps your body from getting the rest that it needs. This condition can increase your risk of certain health problems, including:  · Heart attack.  · Stroke.  · Obesity.  · Diabetes.  · Heart failure.  · Irregular  heartbeat.  There are three kinds of sleep apnea:  · Obstructive sleep apnea. This kind is caused by a blocked or collapsed airway.  · Central sleep apnea. This kind happens when the part of the brain that controls breathing does not send the correct signals to the muscles that control breathing.  · Mixed sleep apnea. This is a combination of obstructive and central sleep apnea.  What are the causes?  The most common cause of this condition is a collapsed or blocked airway. An airway can collapse or become blocked if:  · Your throat muscles are abnormally relaxed.  · Your tongue and tonsils are larger than normal.  · You are overweight.  · Your airway is smaller than normal.  What increases the risk?  This condition is more likely to develop in people who:  · Are overweight.  · Smoke.  · Have a smaller than normal airway.  · Are elderly.  · Are male.  · Drink alcohol.  · Take sedatives or tranquilizers.  · Have a family history of sleep apnea.  What are the signs or symptoms?  Symptoms of this condition include:  · Trouble staying asleep.  · Daytime sleepiness and tiredness.  · Irritability.  · Loud snoring.  · Morning headaches.  · Trouble concentrating.  · Forgetfulness.  · Decreased interest in sex.  · Unexplained sleepiness.  · Mood swings.  · Personality changes.  · Feelings of depression.  · Waking up often during the night to urinate.  · Dry mouth.  · Sore throat.  How is this diagnosed?  This condition may be diagnosed with:  · A medical history.  · A physical exam.  · A series of tests that are done while you are sleeping (sleep study). These tests are usually done in a sleep lab, but they may also be done at home.  How is this treated?  Treatment for this condition aims to restore normal breathing and to ease symptoms during sleep. It may involve managing health issues that can affect breathing, such as high blood pressure or obesity. Treatment may include:  · Sleeping on your side.  · Using a decongestant  if you have nasal congestion.  · Avoiding the use of depressants, including alcohol, sedatives, and narcotics.  · Losing weight if you are overweight.  · Making changes to your diet.  · Quitting smoking.  · Using a device to open your airway while you sleep, such as:  ¨ An oral appliance. This is a custom-made mouthpiece that shifts your lower jaw forward.  ¨ A continuous positive airway pressure (CPAP) device. This device delivers oxygen to your airway through a mask.  ¨ A nasal expiratory positive airway pressure (EPAP) device. This device has valves that you put into each nostril.  ¨ A bi-level positive airway pressure (BPAP) device. This device delivers oxygen to your airway through a mask.  · Surgery if other treatments do not work. During surgery, excess tissue is removed to create a wider airway.  It is important to get treatment for sleep apnea. Without treatment, this condition can lead to:  · High blood pressure.  · Coronary artery disease.  · (Men) An inability to achieve or maintain an erection (impotence).  · Reduced thinking abilities.  Follow these instructions at home:  · Make any lifestyle changes that your health care provider recommends.  · Eat a healthy, well-balanced diet.  · Take over-the-counter and prescription medicines only as told by your health care provider.  · Avoid using depressants, including alcohol, sedatives, and narcotics.  · Take steps to lose weight if you are overweight.  · If you were given a device to open your airway while you sleep, use it only as told by your health care provider.  · Do not use any tobacco products, such as cigarettes, chewing tobacco, and e-cigarettes. If you need help quitting, ask your health care provider.  · Keep all follow-up visits as told by your health care provider. This is important.  Contact a health care provider if:  · The device that you received to open your airway during sleep is uncomfortable or does not seem to be working.  · Your  symptoms do not improve.  · Your symptoms get worse.  Get help right away if:  · You develop chest pain.  · You develop shortness of breath.  · You develop discomfort in your back, arms, or stomach.  · You have trouble speaking.  · You have weakness on one side of your body.  · You have drooping in your face.  These symptoms may represent a serious problem that is an emergency. Do not wait to see if the symptoms will go away. Get medical help right away. Call your local emergency services (911 in the [place] Do not drive yourself to the hospital.   This information is not intended to replace advice given to you by your health care provider. Make sure you discuss any questions you have with your health care provider.  Document Released: 12/08/2003 Document Revised: 08/13/2017 Document Reviewed: 09/26/2016  Elsevier Interactive Patient Education © 2017 [employer]

## 2019-09-11 NOTE — PROGRESS NOTES
"     [facility]  Consult Note     Date: 9/11/2019 / Time: 11:00 AM    Patient ID:   Name:             Niya Kimble     YOB: 1955  Age:                 64 y.o.  female   MRN:               [id number]      Thank you for requesting a sleep medicine consultation on Niya Kimble at the sleep center. She presents today with the chief complaints of snoring, gasping and excessive daytime sleepiness. She is referred by Flora Godwin for evaluation and treatment of sleep disorder breathing.     HISTORY OF PRESENT ILLNESS:       At night,  Niya Kimble goes to bed around 19-11 pm on weekdays and on the weekends. She gets out of bed at 7:30-8 am on weekdays and on the weekends.  She averages about 5 hrs of sleep on a good night and 3 hrs on a bad night. Pt has bad nights about most nights per week. She falls asleep within 5-10 minutes. She uses [employer] as a sleep aide . She awakens 3-4 times during the night due to bathroom use. It takes her few min to fall back asleep.     As per suppose questioner,She is aware of snoring/breathing pauses/gasping or choking in sleep.  She  denies any symptoms of restless legs syndrome such as an \"urge to move\"  She  legs in the evening or bedtime. She  denies any symptoms of narcolepsy such as sleep paralysis or cataplexy, or any symptoms to suggest parasomnias such as sleep walking or acting out of dreams.     Overall, she doesnot finds her sleep refreshing. In terms of  excessive daytime sleepiness,  She complains of sleepiness while reading or watching TV and occasionally while driving. [place] sleepiness scale score is abnormal at  12/24.She occasinally takes a naps. The naps are usually 30 min long.      REVIEW OF SYSTEMS:       Constitutional: Denies fevers, Denies weight changes  Eyes: Denies changes in vision, no eye pain  Ears/Nose/Throat/Mouth: Denies nasal congestion or sore throat   Cardiovascular: Denies chest pain or palpitations "   Respiratory: Denies shortness of breath , Denies cough  Gastrointestinal/Hepatic: Denies abdominal pain, nausea, vomiting, diarrhea, constipation or GI bleeding   Genitourinary: Denies bladder dysfunction, dysuria or frequency  Musculoskeletal/Rheum:+ chronic pain  Skin/Breast: Denies rash  Neurological: Denies headache, confusion, memory loss or focal weakness/parasthesias  Psychiatric: denies mood disorder     Comprehensive review of systems form is reviewed with the patient and is attached in the EMR.     PMH:  has a past medical history of Arthritis, Back pain, Cancer (HCC), Chickenpox, [place] measles, Hypothyroidism, Influenza, Liver disease, Obesity, Other specified symptom associated with female genital organs, Pain, Pneumonia, Snoring, and Unspecified disorder of thyroid.  MEDS:   Current Outpatient Medications:   •  simvastatin (ZOCOR) 40 MG Tab, Take 40 mg by mouth every evening., Disp: , Rfl:   •  levothyroxine (SYNTHROID) 150 MCG Tab, Take 1 Tab by mouth Every morning on an empty stomach. 1.5 tabs on Sunday only, Disp: 96 Tab, Rfl: 0  •  losartan (COZAAR) 100 MG Tab, Take 1 Tab by mouth every day., Disp: 90 Tab, Rfl: 3  •  sertraline (ZOLOFT) 100 MG Tab, Take 1 Tab by mouth every day., Disp: 90 Tab, Rfl: 3  •  diclofenac EC (VOLTAREN) 75 MG Tablet Delayed Response, Take 1 Tab by mouth 2 times a day. (Patient not taking: Reported on 9/11/2019), Disp: 60 Tab, Rfl: 0  •  ezetimibe-simvastatin (VYTORIN) 10-40 MG per tablet, Take 1 Tab by mouth every day. (Patient not taking: Reported on 9/11/2019), Disp: 90 Tab, Rfl: 3  •  ibandronate (BONIVA) 150 MG tablet, Take 1 Tab by mouth every 30 days. (Patient not taking: Reported on 9/11/2019), Disp: 3 Tab, Rfl: 4  •  tretinoin microspheres (RETIN-A MICRO) 0.04 % gel, Apply to affected areas QHS (Patient not taking: Reported on 9/11/2019), Disp: 45 g, Rfl: 3  •  Multiple Vitamins-Minerals (WOMENS MULTIVITAMIN PLUS PO), Take  by mouth every day., Disp: , Rfl:   •   "Aspirin-Caffeine (ANALGESIC PO), Take  by mouth as needed., Disp: , Rfl:   ALLERGIES:   Allergies   Allergen Reactions   • Byetta Swelling   • Lisinopril Cough     SURGHX:   Past Surgical History:   Procedure Laterality Date   • HYSTERECTOMY ROBOTIC  6/12/2012    Performed by JOSIE MARIA at SURGERY [facility] ORS   • LYMPH NODE DISSECTION ROBOTIC  6/12/2012    Performed by JOSIE MARIA at SURGERY [facility] ORS   • ABDOMINAL HYSTERECTOMY TOTAL  6/2012    endometrial cancer; MARIA    • ANKLE LIGAMENT RECONSTRUCTION Right     multiple ankle surgery since childhood-see scanned media report   • CHOLECYSTECTOMY     • HYSTERECTOMY LAPAROSCOPY     • LAMINOTOMY     • LUMBAR LAMINECTOMY DISKECTOMY      Rammy, L5=S1, L4-L5   • OPEN REDUCTION     • OTHER ORTHOPEDIC SURGERY      ankle surgery, multiple   • PRIMARY C SECTION      c/s 1     SOCHX:  reports that she has quit smoking. Her smoking use included cigarettes. She has a 0.20 pack-year smoking history. She has never used smokeless tobacco. She reports that she drinks about 0.6 oz of alcohol per week. She reports that she has current or past drug history. Drug: Marijuana.   FH:   Family History   Problem Relation Age of Onset   • Heart Disease Mother         unk type   • Cancer Mother         breast CA   • Alcohol/Drug Father    • Diabetes Brother    • Thyroid Brother         graves (same one with DM)   • Arthritis Brother         RA (other brother)   • Cancer Sister         uterine CA/lung CA smoker   • Sleep Apnea Neg Hx            Physical Exam:  Vitals/ General Appearance:   Weight/BMI: Body mass index is 34.34 kg/m².  /70 (BP Location: Right arm, Patient Position: Sitting, BP Cuff Size: Adult)   Pulse 62   Resp 15   Ht 1.499 m (4' 11\")   Wt 77.1 kg (170 lb)   SpO2 93%   Vitals:    09/11/19 1052   BP: 110/70   BP Location: Right arm   Patient Position: Sitting   BP Cuff Size: Adult   Pulse: 62   Resp: 15   SpO2: 93%   Weight: 77.1 kg (170 lb)   Height: " "1.499 m (4' 11\")       Pt. is alert and oriented to time, place and person. Cooperative and in no apparent distress.       -Head: Atraumatic, normocephalic.   -. Ears: Normal tympanic membrane and no discharge  -. Nose: No inferior turbinate hypertophy, no septal deviation  -. Throat: Oropharynx appears adequate in that the palate is not overhanging (Malam Phyllis scale 2. Tonsils are not enlarged, uvula is large, pharynx not inflamed.   -. Neck: Supple. No thyromegaly  -. Chest: Trachea central, no spine deformity   -. Lungs auscultation: B/L good air entry, vesicular breath sounds, no adventitious sounds  -. Heart auscultation: 1st and 2nd heart sounds normal, regular rhythm. No appreciable murmur.  - Extremities: no clubbing, no pedal edema.  - Skin: No rash  - NEUROLOGICAL EXAMINATION: On neurological exam, the patient was alert and oriented x3. speech was clear and fluent without dysarthria.      INVESTIGATIONS:       ASSESSMENT AND PLAN     1. She  has symptoms of Obstructive Sleep Apnea (ALYCIA). She  has excessive daytime sleepiness that  interferes with activites of daily livingThe pathophysiology of ALYCIA and the increased risk of cardiovascular morbidity from untreated ALYCIA is discussed in detail with the patient.     We have discussed diagnostic options including in-laboratory, attended polysomnography and home sleep testing. We have also discussed treatment options including airway pressurization, reconstructive otolaryngologic surgery, dental appliances and weight management.       Subsequently,treatment options will be discussed based on the diagnostic study. Meanwhile, She is urged to avoid supine sleep, weight gain and alcoholic beverages since all of these can worsen ALYCIA. She is cautioned against drowsy driving. If She feels sleepy while driving, She must pull over for a break/nap, rather than persist on the road, in the interest of She own safety and that of others on the road.    Plan  -  She  will be " scheduled for an overnight PSG to assess sleep related  breathing disorder.   - Ambien is prescribed only for the night of the SS. I have obtained and reviewed patient utilization report from [facility] prior to writing prescription for controlled substance II, III or IV. Based on the report and my clinical assessment the prescription is medically necessary. Pt was advised to use Ambien on as needed basis. Do not drive or use fast moving machinery after taking the medication for at least 6-8 hrs. Side affects were discussed in detail.      2.The evolution of psychophysiological insomnia is discussed in detail. The importance of cognitive restructuring and behavior modification therapy is emphasized for long-term improvement rather than the use of hypnotic agents, which may offer short term relief but may lead to the development of tolerance and side effects with prolonged use. Evening exercise, wind-down before bedtime, and stimulus control (leaving the bed when unable to fall back asleep at night) are explained.      Plan   Handouts on stimulus control and sleep hygiene are given and a couple of titles of books on insomnia are recommended, written by behavioral psychologists.     3. Regarding treatment of other past medical problems and general health maintenance,  She is urged to follow up with PCP.      Patient's body mass index is 34.34 kg/m². Exercise and nutrition counseling were performed at this visit.

## 2019-09-20 ENCOUNTER — PATIENT MESSAGE (OUTPATIENT)
Dept: MEDICAL GROUP | Facility: MEDICAL CENTER | Age: 64
End: 2019-09-20

## 2019-09-20 DIAGNOSIS — F33.9 EPISODE OF RECURRENT MAJOR DEPRESSIVE DISORDER, UNSPECIFIED DEPRESSION EPISODE SEVERITY (HCC): ICD-10-CM

## 2019-09-20 DIAGNOSIS — E89.0 POSTABLATIVE HYPOTHYROIDISM: ICD-10-CM

## 2019-09-20 DIAGNOSIS — I10 ESSENTIAL HYPERTENSION: ICD-10-CM

## 2019-09-20 RX ORDER — LEVOTHYROXINE SODIUM 0.15 MG/1
150 TABLET ORAL
Qty: 96 TAB | Refills: 0 | Status: SHIPPED | OUTPATIENT
Start: 2019-09-20 | End: 2019-12-31 | Stop reason: SDUPTHER

## 2019-09-20 RX ORDER — LOSARTAN POTASSIUM 100 MG/1
100 TABLET ORAL DAILY
Qty: 90 TAB | Refills: 3 | Status: SHIPPED | OUTPATIENT
Start: 2019-09-20

## 2019-09-20 RX ORDER — TRETINOIN 0.4 MG/G
GEL TOPICAL
Qty: 45 G | Refills: 3 | Status: SHIPPED | OUTPATIENT
Start: 2019-09-20

## 2019-09-20 RX ORDER — SIMVASTATIN 40 MG
40 TABLET ORAL EVERY EVENING
Qty: 90 TAB | Refills: 3 | Status: SHIPPED | OUTPATIENT
Start: 2019-09-20

## 2019-09-20 RX ORDER — SERTRALINE HYDROCHLORIDE 100 MG/1
100 TABLET, FILM COATED ORAL DAILY
Qty: 90 TAB | Refills: 3 | Status: SHIPPED | OUTPATIENT
Start: 2019-09-20

## 2019-09-20 NOTE — TELEPHONE ENCOUNTER
From: Niya Kimble  To: TAYLOR Kemp  Sent: 9/20/2019 9:36 AM PDT  Subject: Prescription Question    Good Morning!  I need an order for my bloodwork to see if I need dosage readjustment to my Levo. I need ALL my perscritions called into meds by mail () I'm reqesting my Retin-A be included.   Please fax order to the [facility] for bloodwork at [phone]    Thank You,  Niya

## 2019-09-23 ENCOUNTER — HOSPITAL ENCOUNTER (OUTPATIENT)
Dept: LAB | Facility: MEDICAL CENTER | Age: 64
End: 2019-09-23
Attending: NURSE PRACTITIONER
Payer: OTHER GOVERNMENT

## 2019-09-23 DIAGNOSIS — E89.0 POSTABLATIVE HYPOTHYROIDISM: ICD-10-CM

## 2019-09-23 LAB — TSH SERPL DL<=0.005 MIU/L-ACNC: 0.14 UIU/ML (ref 0.38–5.33)

## 2019-09-23 PROCEDURE — 36415 COLL VENOUS BLD VENIPUNCTURE: CPT

## 2019-09-23 PROCEDURE — 84443 ASSAY THYROID STIM HORMONE: CPT

## 2019-09-24 ENCOUNTER — TELEPHONE (OUTPATIENT)
Dept: MEDICAL GROUP | Facility: MEDICAL CENTER | Age: 64
End: 2019-09-24

## 2019-09-24 DIAGNOSIS — E89.0 POSTABLATIVE HYPOTHYROIDISM: ICD-10-CM

## 2019-09-24 NOTE — TELEPHONE ENCOUNTER
Called patient. She said that she has only been taking 150 mcg x 7 days a week. Therefore I told her I would clarify the dosage that you would like her to try, and that you would like to repeat the lab in 2 months after medication adjustment.

## 2019-09-24 NOTE — TELEPHONE ENCOUNTER
Okay have her continue 150 mcg daily with only one half tab on Sunday and Tuesday (2 days a week)  whole tablet the other 5 days of the week.

## 2019-09-24 NOTE — TELEPHONE ENCOUNTER
Please call Niya      Thyroid is greatly improved in fact is currently a little bit too low.  Meaning that her medicine is a little too strong.  I think it was still abnormal last time because she was trying to cut and use the 175 mcg strength and that because some abnormalities in the lab work as maybe it was and is consistent.  She is currently on the 150 mcg.     Please clarify how she is taking this.  I believe she is doing an extra half a tab on Sundays    If that is true I would like her to back off that a little bit to where she does a whole 150 mcg 6 days a week with only half a tab on Sundays versus 1.5 tabs on Sundays previously.    I sent my chart as well.    We will need to recheck in 2 months lab pending.

## 2019-10-09 ENCOUNTER — SLEEP STUDY (OUTPATIENT)
Dept: SLEEP MEDICINE | Facility: MEDICAL CENTER | Age: 64
End: 2019-10-09
Attending: FAMILY MEDICINE
Payer: OTHER GOVERNMENT

## 2019-10-09 DIAGNOSIS — G47.30 SLEEP DISORDER BREATHING: ICD-10-CM

## 2019-10-09 PROCEDURE — 95811 POLYSOM 6/>YRS CPAP 4/> PARM: CPT | Performed by: INTERNAL MEDICINE

## 2019-10-10 NOTE — PROCEDURES
The patient underwent a split night polysomnogram with a CPAP titration using the standard montage for measurement of paramaters of sleep, respiratory events, movement abnormalities, heart rate and rhythm.  A Microphone was used to monitior snoring.          DIAGNOSTIC:  Analysis:  Study start time was 10:09:17 PM.  Total recording time was 2h 40.0m (160 minutes) with a total sleep time of 2h 9.0m (129 minutes) resulting in a sleep efficiency of 80.63%.  Sleep latency from the start fo the study was 14 minutes minutes and REM latency from sleep onset was 00 minutes minutes.  Respiratory:   There were 158 apneas in total consisting of 109 obstructive apneas, 0 mixed apneas, and 49 central apneas.  There were 24 hypopneas in total.  The apnea index was 73.49 per hour and the hypopnea index was 11.16 per hour.  The overall AHI was 84.7, with a REM AHI of 0.00, and a supine AHI of 0.00.  Limb Movements:  There were a total of 0 periodic leg movements, of which 0 were PLMS arousals.  This resulted in a PLMS index of 0.0 and a PLMS arousal index of 0.0  Oximetry:  The mean SaO2 was 93.0% for the diagnostic portion of the study, with a minimum SaO2 of 84.0%.              TREATMENT:  Analysis:  Treatment recording time was 4h 43.5m (283 minutes) with a total sleep time of 4h 28.5m (268 minutes) resulting in a sleep efficiency of 94.7%.    Sleep latency from the start of treatment was 01 minutes minutes and REM latency from sleep onset was 0h 31.5m minutes.    The patient had 51 arousals in total for an arousal index of 11.4.  Respiratory:   There were 5 apneas in total consisting of  5 obstructive apneas, 0 central apneas, and 0 mixed apneas for an apnea index of 1.12.    The patient had 9 hypopneas in total, which resulted in a hypopnea index of 2.01.    The overall AHI was 3.13, with a REM AHI of 5.22, and a supine AHI of 0.00.     Limb Movements:  There were a total of 0 periodic leg movements, of which 0 were PLMS  arousals.  This resulted in a PLMS index of 0.0 and a PLMS arousal index of 0.0.  Oximetry:  The mean SaO2 during treatment was 96.0%, with a minimum oxygen saturation of 87.0%.      Interpretation:    Ms. Kimble presented with snoring and excessive daytime somnolence.  This is a split-night study.    In the diagnostic phase there is mild fragmentation of sleep with increased arousal index.  No slow-wave sleep or REM sleep time is included.  There are no scorable periodic limb movements.  The apnea hypopnea index is 84.7 events per hour, consisting of obstructive apnea and hypopnea episodes with some central apnea episodes as well.  No supine sleep time is recorded.  The lowest arterial oxygen saturation was 84% on room air and she spent 15% of the diagnostic time with a saturation below 90%.  The heart rate varied from 60-84 bpm.    In the treatment phase of the study there is a marked improvement in sleep continuity and 57 minutes of REM sleep time are included.  CPAP was applied at 6-8 cm water pressure.  In the final 2 pressure stages the apnea hypopnea index was less than 3 events per hour with a mean arterial oxygen saturation of 95% and a minimum saturation of 91%.  Those stages included 45 minutes of REM sleep time but did not include time in the supine body position.    Assessment:  Very severe obstructive sleep apnea hypopnea with an apnea hypopnea index of 84.7 events per hour including a number of central apnea episodes.  Significant nocturnal hypoxemia with a lowest arterial oxygen saturation of 84% on room air.  Fragmentation of sleep related to the breathing events and improved on treatment.  There is an excellent response to CPAP therapy.    Assessment:  CPAP at 8 cm water pressure.  She did best using a small air fit F 20 fullface mask.    CPAP was tried from 6cm to 9cm H2O.

## 2019-11-20 ENCOUNTER — OFFICE VISIT (OUTPATIENT)
Dept: PULMONOLOGY | Facility: HOSPICE | Age: 64
End: 2019-11-20
Payer: OTHER GOVERNMENT

## 2019-11-20 VITALS
BODY MASS INDEX: 35.88 KG/M2 | WEIGHT: 178 LBS | SYSTOLIC BLOOD PRESSURE: 136 MMHG | HEART RATE: 72 BPM | HEIGHT: 59 IN | DIASTOLIC BLOOD PRESSURE: 74 MMHG | OXYGEN SATURATION: 93 %

## 2019-11-20 DIAGNOSIS — G47.33 OSA (OBSTRUCTIVE SLEEP APNEA): ICD-10-CM

## 2019-11-20 PROCEDURE — 99214 OFFICE O/P EST MOD 30 MIN: CPT | Performed by: NURSE PRACTITIONER

## 2019-11-20 NOTE — PATIENT INSTRUCTIONS
5) Return in about 2 months (around 1/20/2020) for follow up with WILLARD Bowles, if not sooner, Compliance.

## 2019-11-20 NOTE — PROGRESS NOTES
CC:  Here for f/u sleep issues as listed below    HPI:   Niya presents today for follow up obstructive sleep apnea .  PSG from 10/2019 indicated an AHI of 84.7 and low oxygenation of 84%.  CPAP was applied at 6-8 cm water pressure.  At a CPAP pressure of 8 her AHI was reduced to 2.5, mean oxygenation of 95%, REM rebound.    Reviewed results and treatment options including CPAP treatment versus in lab titration, dental appliance, UPPP surgery, and behavioral modifications.  Patient is amendable to CPAP therapy. They understand they may need a future sleep study if treatment is ineffective.     Patient is currently sleeping 5 hours per night with 3-4 nighttime awakenings. They have some trouble falling asleep.   They do not feel refreshed in the morning and sometimes morning H/A. They feel tired throughout the day and naps daily for appx 1-3 hour long.  Patient reports snoring, apnea events and paroxysmal nocturnal dyspnea events. They have never fallen asleep in conversation, at the wheel, or at work.  They deny sleepwalking. Deny symptoms of restless leg.  BMI is 35. Does not have energy to exercise.       Patient Active Problem List    Diagnosis Date Noted   • Postablative hypothyroidism 11/30/2016     Priority: Medium   • Pure hypercholesterolemia 11/30/2016     Priority: Medium   • Essential hypertension 11/30/2016     Priority: Medium   • Osteopenia 11/30/2016     Priority: Low   • Chronic joint pain-ankle, knee, back 11/30/2016     Priority: Low   • Nocturnal hypoxia 06/03/2019   • Positive colorectal cancer screening using Cologuard test 05/29/2019   • Obesity (BMI 30-39.9) 12/11/2017   • Episode of recurrent major depressive disorder (HCC) 11/30/2016   • History of endometrial cancer 11/30/2016       Past Medical History:   Diagnosis Date   • Arthritis     right ankle, back   • Back pain    • Cancer (HCC)     endometrial ca   • Chickenpox    • [place] measles    • Hypothyroidism    • Influenza    • Liver  disease    • Obesity    • Other specified symptom associated with female genital organs    • Pain    • Pneumonia    • Snoring    • Unspecified disorder of thyroid     hypothyroid       Past Surgical History:   Procedure Laterality Date   • HYSTERECTOMY ROBOTIC  6/12/2012    Performed by JOSIE MARIA at SURGERY [facility] ORS   • LYMPH NODE DISSECTION ROBOTIC  6/12/2012    Performed by JOSIE MARIA at SURGERY [facility] ORS   • ABDOMINAL HYSTERECTOMY TOTAL  6/2012    endometrial cancer; MARIA    • ANKLE LIGAMENT RECONSTRUCTION Right     multiple ankle surgery since childhood-see scanned media report   • CHOLECYSTECTOMY     • HYSTERECTOMY LAPAROSCOPY     • LAMINOTOMY     • LUMBAR LAMINECTOMY DISKECTOMY      Rammy, L5=S1, L4-L5   • OPEN REDUCTION     • OTHER ORTHOPEDIC SURGERY      ankle surgery, multiple   • PRIMARY C SECTION      c/s 1       Family History   Problem Relation Age of Onset   • Heart Disease Mother         unk type   • Cancer Mother         breast CA   • Alcohol/Drug Father    • Diabetes Brother    • Thyroid Brother         graves (same one with DM)   • Arthritis Brother         RA (other brother)   • Cancer Sister         uterine CA/lung CA smoker   • Sleep Apnea Neg Hx        Social History     Socioeconomic History   • Marital status:      Spouse name: Not on file   • Number of children: Not on file   • Years of education: Not on file   • Highest education level: Not on file   Occupational History   • Not on file   Social Needs   • Financial resource strain: Not on file   • Food insecurity:     Worry: Not on file     Inability: Not on file   • Transportation needs:     Medical: Not on file     Non-medical: Not on file   Tobacco Use   • Smoking status: Former Smoker     Packs/day: 0.10     Years: 2.00     Pack years: 0.20     Types: Cigarettes   • Smokeless tobacco: Never Used   Substance and Sexual Activity   • Alcohol use: Yes     Alcohol/week: 0.6 oz     Types: 1 Shots of liquor per week   •  Drug use: Yes     Types: Marijuana     Comment: medical marijuana for pain   • Sexual activity: Not Currently     Birth control/protection: Surgical   Lifestyle   • Physical activity:     Days per week: Not on file     Minutes per session: Not on file   • Stress: Not on file   Relationships   • Social connections:     Talks on phone: Not on file     Gets together: Not on file     Attends [employer] service: Not on file     Active member of club or organization: Not on file     Attends meetings of clubs or organizations: Not on file     Relationship status: Not on file   • Intimate partner violence:     Fear of current or ex partner: Not on file     Emotionally abused: Not on file     Physically abused: Not on file     Forced sexual activity: Not on file   Other Topics Concern   • Primary/coprimary nurse & associates Not Asked   • Family contact information Not Asked   • OK to release patient information to the following Not Asked   • Patient preferred routine/Privacy concerns Not Asked   • Patient likes and dislikes Not Asked   • Participating In Research Study Not Asked   • Miscellaneous Not Asked   Social History Narrative   • Not on file       Current Outpatient Medications   Medication Sig Dispense Refill   • simvastatin (ZOCOR) 40 MG Tab Take 1 Tab by mouth every evening. 90 Tab 3   • levothyroxine (SYNTHROID) 150 MCG Tab Take 1 Tab by mouth Every morning on an empty stomach. 1.5 tabs on Sunday only 96 Tab 0   • losartan (COZAAR) 100 MG Tab Take 1 Tab by mouth every day. 90 Tab 3   • sertraline (ZOLOFT) 100 MG Tab Take 1 Tab by mouth every day. 90 Tab 3   • tretinoin microspheres (RETIN-A MICRO) 0.04 % gel Apply to affected areas QHS 45 g 3   • Multiple Vitamins-Minerals (WOMENS MULTIVITAMIN PLUS PO) Take  by mouth every day.     • Aspirin-Caffeine (ANALGESIC PO) Take  by mouth as needed.       No current facility-administered medications for this visit.           Allergies: Byetta and Lisinopril      ROS   Gen:  "Denies fever, chills, unintentional weight loss, Resp:Denies Dyspnea  CV: Denies chest pain, chest tightness  Sleep:Denies morning headache, insomnia,   Neuro: Denies frequent headaches, weakness, dizziness  See HPI.  All other systems reviewed and negative        Vital signs for this encounter:  Vitals:    11/20/19 1302   Height: 1.499 m (4' 11\")   Weight: 80.7 kg (178 lb)   Weight % change since last entry.: 0 %   BP: 136/74   Pulse: 72   BMI (Calculated): 35.95                   Physical Exam:   Gen:         Alert and oriented, No apparent distress.   Neck:        No Lymphadenopathy.  Lungs:     Clear to auscultation bilaterally.    CV:          Regular rate and rhythm. No murmurs, rubs or gallops.   Abd:         Soft non tender, non distended.            Ext:          No clubbing, cyanosis, edema.    Assessment   1. ALYCIA (obstructive sleep apnea)  DME CPAP   2. BMI 35.0-35.9,adult  OBESITY COUNSELING (No Charge): Patient identified as having weight management issue.  Appropriate orders and counseling given.       Patient is clinically stable and will proceed with following plan.     PLAN:   Patient Instructions   1) Start CPAP at 8cmH20  2) Discussed acclimating to machine and change mask within first 30 days if required. Bring chip download to each appointment.  3) Light conditioning encouraged  4) Vaccines: Recommended  5) Continue smoking cessation  6) Return in about 2 months (around 1/20/2020) for follow up with WILLARD Bowles, if not sooner, Compliance.        "

## 2019-12-18 ENCOUNTER — TELEPHONE (OUTPATIENT)
Dept: SLEEP MEDICINE | Facility: MEDICAL CENTER | Age: 64
End: 2019-12-18

## 2019-12-18 NOTE — TELEPHONE ENCOUNTER
Pt LVM stating that she was last seen by Annie on 11/19/19 and was order a new cpap machine. The pt stated that she still has not receive a call from [facility] regarding the order and would like a call back with there contact information.     I emailed Kimberly at [facility] and she stated that they have attempted to contact on 12/12/19 and 12/17/19 at [phone]. I called the pt and informed her of this and stated that better number to contact her would be her cell which is [phone]. I informed her that I will let Kimberly know of this so she can be contacted later today.

## 2019-12-30 ENCOUNTER — HOSPITAL ENCOUNTER (OUTPATIENT)
Dept: LAB | Facility: MEDICAL CENTER | Age: 64
End: 2019-12-30
Attending: NURSE PRACTITIONER
Payer: OTHER GOVERNMENT

## 2019-12-30 DIAGNOSIS — E89.0 POSTABLATIVE HYPOTHYROIDISM: ICD-10-CM

## 2019-12-30 LAB — TSH SERPL DL<=0.005 MIU/L-ACNC: 2.39 UIU/ML (ref 0.38–5.33)

## 2019-12-30 PROCEDURE — 36415 COLL VENOUS BLD VENIPUNCTURE: CPT

## 2019-12-30 PROCEDURE — 84443 ASSAY THYROID STIM HORMONE: CPT

## 2019-12-31 ENCOUNTER — OFFICE VISIT (OUTPATIENT)
Dept: MEDICAL GROUP | Facility: MEDICAL CENTER | Age: 64
End: 2019-12-31
Payer: OTHER GOVERNMENT

## 2019-12-31 VITALS
DIASTOLIC BLOOD PRESSURE: 66 MMHG | SYSTOLIC BLOOD PRESSURE: 124 MMHG | TEMPERATURE: 97.8 F | HEART RATE: 79 BPM | BODY MASS INDEX: 34.47 KG/M2 | RESPIRATION RATE: 14 BRPM | OXYGEN SATURATION: 95 % | WEIGHT: 171 LBS | HEIGHT: 59 IN

## 2019-12-31 DIAGNOSIS — R20.0 BILATERAL HAND NUMBNESS: ICD-10-CM

## 2019-12-31 DIAGNOSIS — G89.29 CHRONIC LOW BACK PAIN, UNSPECIFIED BACK PAIN LATERALITY, UNSPECIFIED WHETHER SCIATICA PRESENT: ICD-10-CM

## 2019-12-31 DIAGNOSIS — Z98.890 S/P LUMBAR LAMINECTOMY: ICD-10-CM

## 2019-12-31 DIAGNOSIS — G89.29 CHRONIC NECK PAIN: ICD-10-CM

## 2019-12-31 DIAGNOSIS — K63.5 POLYP OF COLON, UNSPECIFIED PART OF COLON, UNSPECIFIED TYPE: ICD-10-CM

## 2019-12-31 DIAGNOSIS — M54.50 CHRONIC LOW BACK PAIN, UNSPECIFIED BACK PAIN LATERALITY, UNSPECIFIED WHETHER SCIATICA PRESENT: ICD-10-CM

## 2019-12-31 DIAGNOSIS — M54.2 CHRONIC NECK PAIN: ICD-10-CM

## 2019-12-31 DIAGNOSIS — Z12.31 ENCOUNTER FOR SCREENING MAMMOGRAM FOR BREAST CANCER: ICD-10-CM

## 2019-12-31 DIAGNOSIS — E89.0 POSTABLATIVE HYPOTHYROIDISM: ICD-10-CM

## 2019-12-31 DIAGNOSIS — Z23 NEED FOR VACCINATION: ICD-10-CM

## 2019-12-31 PROCEDURE — 99214 OFFICE O/P EST MOD 30 MIN: CPT | Mod: 25 | Performed by: NURSE PRACTITIONER

## 2019-12-31 PROCEDURE — 90750 HZV VACC RECOMBINANT IM: CPT | Performed by: NURSE PRACTITIONER

## 2019-12-31 PROCEDURE — 90686 IIV4 VACC NO PRSV 0.5 ML IM: CPT | Performed by: NURSE PRACTITIONER

## 2019-12-31 PROCEDURE — 90472 IMMUNIZATION ADMIN EACH ADD: CPT | Performed by: NURSE PRACTITIONER

## 2019-12-31 PROCEDURE — 90471 IMMUNIZATION ADMIN: CPT | Performed by: NURSE PRACTITIONER

## 2019-12-31 RX ORDER — IBUPROFEN 800 MG/1
800 TABLET ORAL
Qty: 21 TAB | Refills: 0 | Status: SHIPPED | OUTPATIENT
Start: 2019-12-31 | End: 2020-01-07

## 2019-12-31 RX ORDER — LEVOTHYROXINE SODIUM 0.15 MG/1
150 TABLET ORAL
Qty: 90 TAB | Refills: 3 | Status: SHIPPED | OUTPATIENT
Start: 2019-12-31

## 2019-12-31 NOTE — PROGRESS NOTES
CC: Results        HPI:     Niya presents today for the followin. Postablative hypothyroidism  TSH drawn today and show she is currently euthyroid.  We will continue her at levothyroxine 150 mcg.  On  and Wednesday she takes only half a tab    2. Polyp of colon, unspecified part of colon, unspecified type  Due for 6-month recall [facility] due to abnormal polyps when she had it last done in .    3. S/P lumbar laminectomy/ Chronic low back pain, unspecified back pain laterality, unspecified whether sciatica present  Status post laminectomy in  in [place].  States over the last 6 to 12 months she is been having worsening lower back pain.  Would like to see Dr. beebe regarding this.  No recent imaging or imaging done here locally.  No overt symptoms of radiculopathy.  No symptoms of cauda equina.  No new injury or trauma    5. Chronic neck pain/Bilateral hand numbness  States also over the last several months she is been having worsening neck pain.  Unsure if associated but states that intermittently she will have bilateral hand numbness.  For example she states when driving.  No previous cervical spine surgery.    Current Outpatient Medications   Medication Sig Dispense Refill   • levothyroxine (SYNTHROID) 150 MCG Tab Take 1 Tab by mouth Every morning on an empty stomach.  and Wednesday 1/2 tab only. 90 Tab 3   • ibuprofen (MOTRIN) 800 MG Tab Take 1 Tab by mouth 3 times a day, with meals for 7 days. 21 Tab 0   • simvastatin (ZOCOR) 40 MG Tab Take 1 Tab by mouth every evening. 90 Tab 3   • losartan (COZAAR) 100 MG Tab Take 1 Tab by mouth every day. 90 Tab 3   • sertraline (ZOLOFT) 100 MG Tab Take 1 Tab by mouth every day. 90 Tab 3   • tretinoin microspheres (RETIN-A MICRO) 0.04 % gel Apply to affected areas QHS 45 g 3   • Multiple Vitamins-Minerals (WOMENS MULTIVITAMIN PLUS PO) Take  by mouth every day.     • Aspirin-Caffeine (ANALGESIC PO) Take  by mouth as  "needed.       No current facility-administered medications for this visit.      Social History     Tobacco Use   • Smoking status: Former Smoker     Packs/day: 0.10     Years: 2.00     Pack years: 0.20     Types: Cigarettes   • Smokeless tobacco: Never Used   Substance Use Topics   • Alcohol use: Yes     Alcohol/week: 0.6 oz     Types: 1 Shots of liquor per week   • Drug use: Yes     Types: Marijuana     Comment: medical marijuana for pain     I reviewed patients allergies, problem list and medications today in EPIC.    ROS: Any/all pertinent positives listed in the HPI, otherwise all others reviewed are negative today.      /66 (BP Location: Left arm, Patient Position: Sitting, BP Cuff Size: Adult)   Pulse 79   Temp 36.6 °C (97.8 °F) (Temporal)   Resp 14   Ht 1.499 m (4' 11\")   Wt 77.6 kg (171 lb)   SpO2 95%   BMI 34.54 kg/m²     Exam:    Gen: Alert and oriented, No apparent distress. WDWN  Psych: A+Ox3, normal affect and mood  Skin: Warm, dry and intact. Good turgor   No rashes in visible areas.  Eye: Conjunctiva clear, lids normal  ENMT: Lips without lesions, good dentition  Lungs: Unlabored respiratory effort.     Abd: Soft non tender, non distended. Normal active bowel sounds.  Palpable xiphoid process, reassurance   No Hepatosplenomegaly, No pulsatile masses.   DTR symmetrical, lower extremity strength symmetrical and appropriate  Positive Phalen's    Assessment and Plan.   64 y.o. female with the following issues.    1. Postablative hypothyroidism  Currently euthyroid.  We will continue medication at same dose and plan to check in 1 year  - levothyroxine (SYNTHROID) 150 MCG Tab; Take 1 Tab by mouth Every morning on an empty stomach. Sunday and Wednesday 1/2 tab only.  Dispense: 90 Tab; Refill: 3    2. Polyp of colon, unspecified part of colon, unspecified type  Due to repeat her colonoscopy  - REFERRAL TO GASTROENTEROLOGY    3. S/P lumbar laminectomy/Chronic low back pain, unspecified back pain " laterality, unspecified whether sciatica present/ Chronic neck pain  Stable and chronic we discussed option for imaging and we will wait until she sees neurosurgery for imaging of their preference.  - REFERRAL TO NEUROSURGERY    6. Bilateral hand numbness  I suspect this is more related to carpal tunnel.  She was given information from up-to-date on carpal tunnel.  Discussed wrist splints and we will do a trial of ibuprofen 800 mg 3 times daily with food as tolerated for up to 7 days.  - REFERRAL TO NEUROSURGERY    7. Encounter for screening mammogram for breast cancer  Ordered  - MA-SCREEN MAMMO W/CAD-BILAT; Future    8. Need for vaccination  I have placed the below orders and discussed them with an approved delegating provider. The MA is performing the below orders under the direction of an office MD.  -Given today.  - Influenza Vaccine Quad Injection (PF)  - Shingles Vaccine (SHINGRIX)      Goal to complete Pap in our office over the next year

## 2020-03-05 ENCOUNTER — SLEEP CENTER VISIT (OUTPATIENT)
Dept: SLEEP MEDICINE | Facility: MEDICAL CENTER | Age: 65
End: 2020-03-05
Payer: OTHER GOVERNMENT

## 2020-03-05 VITALS
OXYGEN SATURATION: 93 % | HEART RATE: 65 BPM | BODY MASS INDEX: 36.6 KG/M2 | SYSTOLIC BLOOD PRESSURE: 124 MMHG | WEIGHT: 181.56 LBS | HEIGHT: 59 IN | DIASTOLIC BLOOD PRESSURE: 76 MMHG

## 2020-03-05 DIAGNOSIS — E66.9 OBESITY (BMI 30-39.9): ICD-10-CM

## 2020-03-05 DIAGNOSIS — G47.33 OSA (OBSTRUCTIVE SLEEP APNEA): ICD-10-CM

## 2020-03-05 PROCEDURE — 99214 OFFICE O/P EST MOD 30 MIN: CPT | Performed by: NURSE PRACTITIONER

## 2020-03-05 NOTE — PROGRESS NOTES
CC:  Here for f/u sleep issues as listed below    HPI:   Niya presents today for follow up obstructive sleep apnea.  Past medical history of post ablative hypothyroidism, osteopenia, chronic joint pain, hypertension, depression, history of endometrial cancer.     PSG from 10/2019 indicated an AHI of 84.7 and low oxygenation of 84%.    Currently she is being treated with CPAP @ 8cmH20.  Compliance download from the dates 2/4/2020 - 3/4/2020 indicates she is wearing the device 93.3% for an avg of 7 hours and 2 minutes per night with a reduced AHI of 7.3.  She does tolerate pressure and mask well.  She wakes up refreshed and is less tired throughout the day. She still naps, but much less than before on the machine. They deny morning H/A. She sleeps better overall. She will continue to clean supplies weekly and change them as insurance allows.  BMI is 36.  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  She may consider taking him to yoga in the future.      Patient Active Problem List    Diagnosis Date Noted   • Postablative hypothyroidism 11/30/2016     Priority: Medium   • Pure hypercholesterolemia 11/30/2016     Priority: Medium   • Essential hypertension 11/30/2016     Priority: Medium   • Osteopenia 11/30/2016     Priority: Low   • Chronic joint pain-ankle, knee, back 11/30/2016     Priority: Low   • Nocturnal hypoxia 06/03/2019   • Positive colorectal cancer screening using Cologuard test 05/29/2019   • Obesity (BMI 30-39.9) 12/11/2017   • Episode of recurrent major depressive disorder (HCC) 11/30/2016   • History of endometrial cancer 11/30/2016       Past Medical History:   Diagnosis Date   • Arthritis     right ankle, back   • Back pain    • Cancer (HCC)     endometrial ca   • Chickenpox    • Upper sorbian measles    • Hypothyroidism    • Influenza    • Liver disease    • Obesity    • Other specified symptom associated with female genital organs    • Pain    • Pneumonia    • Snoring    • Unspecified disorder  of thyroid     hypothyroid       Past Surgical History:   Procedure Laterality Date   • HYSTERECTOMY ROBOTIC  6/12/2012    Performed by JOSIE MARIA at [facility]   • LYMPH NODE DISSECTION ROBOTIC  6/12/2012    Performed by JOSIE MARIA at [facility]   • ABDOMINAL HYSTERECTOMY TOTAL  6/2012    endometrial cancer; MARIA    • ANKLE LIGAMENT RECONSTRUCTION Right     multiple ankle surgery since childhood-see scanned media report   • CHOLECYSTECTOMY     • HYSTERECTOMY LAPAROSCOPY     • LAMINOTOMY     • LUMBAR LAMINECTOMY DISKECTOMY      Rammy, L5=S1, L4-L5   • OPEN REDUCTION     • OTHER ORTHOPEDIC SURGERY      ankle surgery, multiple   • PRIMARY C SECTION      c/s 1       Family History   Problem Relation Age of Onset   • Heart Disease Mother         unk type   • Cancer Mother         breast CA   • Alcohol/Drug Father    • Diabetes Brother    • Thyroid Brother         graves (same one with DM)   • Arthritis Brother         RA (other brother)   • Cancer Sister         uterine CA/lung CA smoker   • Sleep Apnea Neg Hx        Social History     Socioeconomic History   • Marital status:      Spouse name: Not on file   • Number of children: Not on file   • Years of education: Not on file   • Highest education level: Not on file   Occupational History   • Not on file   Social Needs   • Financial resource strain: Not on file   • Food insecurity     Worry: Not on file     Inability: Not on file   • Transportation needs     Medical: Not on file     Non-medical: Not on file   Tobacco Use   • Smoking status: Former Smoker     Packs/day: 0.10     Years: 2.00     Pack years: 0.20     Types: Cigarettes   • Smokeless tobacco: Never Used   Substance and Sexual Activity   • Alcohol use: Yes     Alcohol/week: 0.6 oz     Types: 1 Shots of liquor per week   • Drug use: Yes     Types: Marijuana     Comment: medical marijuana for pain   • Sexual activity: Not Currently     Birth control/protection: Surgical    Lifestyle   • Physical activity     Days per week: Not on file     Minutes per session: Not on file   • Stress: Not on file   Relationships   • Social connections     Talks on phone: Not on file     Gets together: Not on file     Attends Islam service: Not on file     Active member of club or organization: Not on file     Attends meetings of clubs or organizations: Not on file     Relationship status: Not on file   • Intimate partner violence     Fear of current or ex partner: Not on file     Emotionally abused: Not on file     Physically abused: Not on file     Forced sexual activity: Not on file   Other Topics Concern   • Primary/coprimary nurse & associates Not Asked   • Family contact information Not Asked   • OK to release patient information to the following Not Asked   • Patient preferred routine/Privacy concerns Not Asked   • Patient likes and dislikes Not Asked   • Participating In Research Study Not Asked   • Miscellaneous Not Asked   Social History Narrative   • Not on file       Current Outpatient Medications   Medication Sig Dispense Refill   • levothyroxine (SYNTHROID) 150 MCG Tab Take 1 Tab by mouth Every morning on an empty stomach. Sunday and Wednesday 1/2 tab only. 90 Tab 3   • simvastatin (ZOCOR) 40 MG Tab Take 1 Tab by mouth every evening. 90 Tab 3   • losartan (COZAAR) 100 MG Tab Take 1 Tab by mouth every day. 90 Tab 3   • sertraline (ZOLOFT) 100 MG Tab Take 1 Tab by mouth every day. 90 Tab 3   • tretinoin microspheres (RETIN-A MICRO) 0.04 % gel Apply to affected areas QHS 45 g 3   • Multiple Vitamins-Minerals (WOMENS MULTIVITAMIN PLUS PO) Take  by mouth every day.     • Aspirin-Caffeine (ANALGESIC PO) Take  by mouth as needed.       No current facility-administered medications for this visit.           Allergies: Byetta and Lisinopril      ROS   Gen: Denies fever, chills, unintentional weight loss, fatigue  Resp:Denies Dyspnea  CV: Denies chest pain, chest tightness  Sleep:Denies morning  "headache, insomnia, daytime somnolence, snoring, gasping for air, apnea  Neuro: Denies frequent headaches, weakness, dizziness  See HPI.  All other systems reviewed and negative        Vital signs for this encounter:  Vitals:    03/05/20 1349 03/05/20 1350   Height:  1.499 m (4' 11\")   Weight:  82.4 kg (181 lb 9 oz)   Weight % change since last entry.:  0 %   BP:  124/76   Pulse:  65   BMI (Calculated):  36.67   O2 sat % room air: 93 %                    Physical Exam:   Gen:         Alert and oriented, No apparent distress.   Neck:        No Lymphadenopathy.  Lungs:     Clear to auscultation bilaterally.    CV:          Regular rate and rhythm. No murmurs, rubs or gallops.   Abd:         Soft non tender, non distended.            Ext:          No clubbing, cyanosis, edema.    Assessment   1. ALYCIA (obstructive sleep apnea)  DME Other   2. Obesity (BMI 30-39.9)  OBESITY COUNSELING (No Charge): Patient identified as having weight management issue.  Appropriate orders and counseling given.       Patient is clinically stable and will proceed with following plan.  Face-to-face time greater than 50% of 25 minutes reviewing: Compliance, symptom improvement overall.  We will change pressure to accommodate for elevated AHI.  Will change mask to make it feel more comfortable.    PLAN:   Patient Instructions   1) Continue CPAP and increase to [id number]  2) Clean mask and supplies weekly and change them as insurance allows - new mask ordered  3) Light conditioning encouraged  4) Vaccines:   Up-to-date with flu  5) Continue smoking cessation  6) Return in about 3 months (around 6/5/2020) for follow up with WILLARD Bowles, Compliance.          "

## 2020-03-05 NOTE — PATIENT INSTRUCTIONS
1) Continue CPAP and increase to [id number]  2) Clean mask and supplies weekly and change them as insurance allows  3) Light conditioning encouraged  4) Vaccines:  Up-to-date with flu  5) Continue smoking cessation  6) Return in about 3 months (around 6/5/2020) for follow up with WILLARD Bowles, Compliance.

## 2020-03-22 ENCOUNTER — PATIENT MESSAGE (OUTPATIENT)
Dept: SLEEP MEDICINE | Facility: MEDICAL CENTER | Age: 65
End: 2020-03-22

## 2020-05-24 ENCOUNTER — PATIENT MESSAGE (OUTPATIENT)
Dept: MEDICAL GROUP | Facility: MEDICAL CENTER | Age: 65
End: 2020-05-24

## 2020-05-24 DIAGNOSIS — G89.29 CHRONIC LOW BACK PAIN, UNSPECIFIED BACK PAIN LATERALITY, UNSPECIFIED WHETHER SCIATICA PRESENT: ICD-10-CM

## 2020-05-24 DIAGNOSIS — M54.2 CHRONIC NECK PAIN: ICD-10-CM

## 2020-05-24 DIAGNOSIS — R20.0 BILATERAL HAND NUMBNESS: ICD-10-CM

## 2020-05-24 DIAGNOSIS — Z98.890 S/P LUMBAR LAMINECTOMY: ICD-10-CM

## 2020-05-24 DIAGNOSIS — M54.50 CHRONIC LOW BACK PAIN, UNSPECIFIED BACK PAIN LATERALITY, UNSPECIFIED WHETHER SCIATICA PRESENT: ICD-10-CM

## 2020-05-24 DIAGNOSIS — G89.29 CHRONIC NECK PAIN: ICD-10-CM

## 2020-06-15 ENCOUNTER — HOSPITAL ENCOUNTER (OUTPATIENT)
Dept: RADIOLOGY | Facility: MEDICAL CENTER | Age: 65
End: 2020-06-15
Attending: CLINICAL NURSE SPECIALIST
Payer: OTHER GOVERNMENT

## 2020-06-15 DIAGNOSIS — M54.12 RADICULOPATHY, CERVICAL: ICD-10-CM

## 2020-06-15 DIAGNOSIS — M54.16 LUMBAR RADICULOPATHY: ICD-10-CM

## 2020-06-15 DIAGNOSIS — M54.12 RADICULOPATHY OF CERVICAL SPINE: ICD-10-CM

## 2020-06-15 DIAGNOSIS — M54.16 RADICULOPATHY, LUMBAR REGION: ICD-10-CM

## 2020-06-15 PROCEDURE — 72148 MRI LUMBAR SPINE W/O DYE: CPT

## 2020-06-15 PROCEDURE — 72110 X-RAY EXAM L-2 SPINE 4/>VWS: CPT

## 2020-06-15 PROCEDURE — 72141 MRI NECK SPINE W/O DYE: CPT

## 2020-06-15 PROCEDURE — 72050 X-RAY EXAM NECK SPINE 4/5VWS: CPT

## 2020-06-17 ENCOUNTER — TELEMEDICINE (OUTPATIENT)
Dept: SLEEP MEDICINE | Facility: MEDICAL CENTER | Age: 65
End: 2020-06-17
Payer: OTHER GOVERNMENT

## 2020-06-17 VITALS — WEIGHT: 175 LBS | HEIGHT: 59 IN | BODY MASS INDEX: 35.28 KG/M2

## 2020-06-17 DIAGNOSIS — G47.33 OSA (OBSTRUCTIVE SLEEP APNEA): ICD-10-CM

## 2020-06-17 PROCEDURE — 99213 OFFICE O/P EST LOW 20 MIN: CPT | Mod: 95,CR | Performed by: NURSE PRACTITIONER

## 2020-06-17 NOTE — PROGRESS NOTES
Telemedicine Visit: Established Patient     This encounter was conducted via Zoom .   Verbal consent was obtained. Patient's identity was verified.    Subjective:   CC: ALYCIA f/u    Niya Kimble is a 64 y.o. female presenting for evaluation and management of ALYCIA. PMH includes major depressive disorder, obesity, osteopenia, hypertension, endometrial cancer.    First compliance report from 5/18/20-6/16/20 was downloaded and reviewed which showed CPAP at 10 cmH2O, 86.7% compliance, 6 hrs 11 min use (60% for >4 hrs), AHI of 4.1.  She is tolerating the pressure well.  She is using a nasal pillow mask but states that at times she feels claustrophobic, and that is why she has not used the mask at times for more than 4 hours.  She goes to bed between 9:30-10:30 pm and wakes up between 6:30-7:30.  She is feeling more rested since she started on CPAP therapy.  She denies morning headaches.  She denies any new health problems or medications.    ROS   Denies any recent fevers or chills. No nausea or vomiting. No chest pains or shortness of breath.     Allergies   Allergen Reactions   • Byetta Swelling   • Lisinopril Cough       Current medicines (including changes today)  Current Outpatient Medications   Medication Sig Dispense Refill   • levothyroxine (SYNTHROID) 150 MCG Tab Take 1 Tab by mouth Every morning on an empty stomach. Sunday and Wednesday 1/2 tab only. 90 Tab 3   • simvastatin (ZOCOR) 40 MG Tab Take 1 Tab by mouth every evening. 90 Tab 3   • losartan (COZAAR) 100 MG Tab Take 1 Tab by mouth every day. 90 Tab 3   • sertraline (ZOLOFT) 100 MG Tab Take 1 Tab by mouth every day. 90 Tab 3   • tretinoin microspheres (RETIN-A MICRO) 0.04 % gel Apply to affected areas QHS 45 g 3   • Multiple Vitamins-Minerals (WOMENS MULTIVITAMIN PLUS PO) Take  by mouth every day.     • Aspirin-Caffeine (ANALGESIC PO) Take  by mouth as needed.       No current facility-administered medications for this visit.        Patient Active Problem List  "   Diagnosis Date Noted   • Postablative hypothyroidism 11/30/2016     Priority: Medium   • Pure hypercholesterolemia 11/30/2016     Priority: Medium   • Essential hypertension 11/30/2016     Priority: Medium   • Osteopenia 11/30/2016     Priority: Low   • Chronic joint pain-ankle, knee, back 11/30/2016     Priority: Low   • Nocturnal hypoxia 06/03/2019   • Positive colorectal cancer screening using Cologuard test 05/29/2019   • Obesity (BMI 30-39.9) 12/11/2017   • Episode of recurrent major depressive disorder (HCC) 11/30/2016   • History of endometrial cancer 11/30/2016       Family History   Problem Relation Age of Onset   • Heart Disease Mother         unk type   • Cancer Mother         breast CA   • Alcohol/Drug Father    • Diabetes Brother    • Thyroid Brother         graves (same one with DM)   • Arthritis Brother         RA (other brother)   • Cancer Sister         uterine CA/lung CA smoker   • Sleep Apnea Neg Hx        She  has a past medical history of Arthritis, Back pain, Cancer (HCC), Chickenpox, Czech measles, Hypothyroidism, Influenza, Liver disease, Obesity, Other specified symptom associated with female genital organs, Pain, Pneumonia, Snoring, and Unspecified disorder of thyroid.  She  has a past surgical history that includes other orthopedic surgery; hysterectomy robotic (6/12/2012); lymph node dissection robotic (6/12/2012); abdominal hysterectomy total (6/2012); open reduction; ankle ligament reconstruction (Right); cholecystectomy; primary c section; lumbar laminectomy diskectomy; laminotomy; and hysterectomy laparoscopy.       Objective:   Ht 1.499 m (4' 11\")   Wt 79.4 kg (175 lb)   BMI 35.35 kg/m²     Physical Exam:  Constitutional: Alert, no distress, well-groomed.  Skin: No rashes in visible areas.  Eye: Round. Conjunctiva clear, lids normal. No icterus.   ENMT: Lips pink without lesions, good dentition, moist mucous membranes. Phonation normal.  Neck: No masses, no thyromegaly. Moves " freely without pain.  CV: Pulse as reported by patient  Respiratory: Unlabored respiratory effort, no cough or audible wheeze  Psych: Alert and oriented x3, normal affect and mood.       Assessment and Plan:   The following treatment plan was discussed:     1. ALYCIA (obstructive sleep apnea)    2. BMI 35.0-35.9,adult    1. First compliance report from 5/18/20-6/16/20 was downloaded and reviewed which showed CPAP at 10 cmH2O, 86.7% compliance, 6 hrs 11 min use (60% for >4 hrs), AHI of 4.1.  Continue CPAP.  Patient is compliant and benefiting from CPAP therapy for management of ALYCIA.   2.  Continue to stay active.  3.  Encouraged patient to utilize the machine for more than 4 hours at a time and with more frequent use she will hopefully get more use to the mask and it will feel as claustrophobic.      Follow-up: Return in about 3 months (around 9/17/2020).     WENCESLAO Anthnoy.

## 2020-07-10 ENCOUNTER — PATIENT MESSAGE (OUTPATIENT)
Dept: MEDICAL GROUP | Facility: MEDICAL CENTER | Age: 65
End: 2020-07-10

## 2020-07-10 DIAGNOSIS — M25.511 BILATERAL SHOULDER PAIN, UNSPECIFIED CHRONICITY: ICD-10-CM

## 2020-07-10 DIAGNOSIS — R20.0 ARM NUMBNESS: ICD-10-CM

## 2020-07-10 DIAGNOSIS — M25.512 BILATERAL SHOULDER PAIN, UNSPECIFIED CHRONICITY: ICD-10-CM

## 2021-03-19 ENCOUNTER — TELEPHONE (OUTPATIENT)
Dept: SLEEP MEDICINE | Facility: MEDICAL CENTER | Age: 66
End: 2021-03-19

## 2021-03-19 DIAGNOSIS — G47.33 OSA (OBSTRUCTIVE SLEEP APNEA): ICD-10-CM

## 2021-03-19 NOTE — TELEPHONE ENCOUNTER
Pt called in requesting an order for mask and supplies. She informed that since she was last seen she has since moved to [place] and her PCP there did not feel comfortable writing her an rx for supplies. Pt was last seen on 6/2020. Order pended please sign if appropriate.

## 2021-03-29 NOTE — TELEPHONE ENCOUNTER
Unsure where pt is going to want this order sent since [facility] does not operate out of [place].  Please verify where she wants the order sent.  Please make sure pt is aware unless she plans on flying back to [place] for yearly OV, she will need to establish with a sleep MD where she lives, this will be the last order we can sign for her.

## 2021-03-30 NOTE — PATIENT COMMUNICATION
Abdomen , soft, nontender, nondistended , no guarding or rigidity , no masses palpable , normal bowel sounds , Liver and Spleen , no hepatomegaly present , no hepatosplenomegaly , liver nontender , spleen not palpable  Large Kidneys - R > L. Per [facility] nothing else is needed and they have not received any denials.  They will follow up with pt and advise her that nothing is being denied and that they have everything they need

## 2021-03-31 NOTE — TELEPHONE ENCOUNTER
Pt stated that she did want order to be faxed to DME:  [facility] / ph [phone] / fax [phone] as she said they knew she had moved and would ship to her [facility]. She is also aware that we can not write for her anymore if she isn't going to be seen at least yearly. Order has been faxed.